# Patient Record
Sex: FEMALE | Race: BLACK OR AFRICAN AMERICAN | Employment: FULL TIME | URBAN - METROPOLITAN AREA
[De-identification: names, ages, dates, MRNs, and addresses within clinical notes are randomized per-mention and may not be internally consistent; named-entity substitution may affect disease eponyms.]

---

## 2017-01-19 ENCOUNTER — OFFICE VISIT (OUTPATIENT)
Dept: FAMILY MEDICINE CLINIC | Age: 57
End: 2017-01-19

## 2017-01-19 VITALS
WEIGHT: 205 LBS | HEART RATE: 58 BPM | RESPIRATION RATE: 16 BRPM | BODY MASS INDEX: 34.16 KG/M2 | HEIGHT: 65 IN | TEMPERATURE: 97.2 F | DIASTOLIC BLOOD PRESSURE: 87 MMHG | SYSTOLIC BLOOD PRESSURE: 146 MMHG

## 2017-01-19 DIAGNOSIS — R06.2 WHEEZING: ICD-10-CM

## 2017-01-19 DIAGNOSIS — K59.09 CHRONIC CONSTIPATION: ICD-10-CM

## 2017-01-19 DIAGNOSIS — I10 ESSENTIAL HYPERTENSION: ICD-10-CM

## 2017-01-19 DIAGNOSIS — Z51.81 MEDICATION MONITORING ENCOUNTER: ICD-10-CM

## 2017-01-19 DIAGNOSIS — E11.9 CONTROLLED TYPE 2 DIABETES MELLITUS WITHOUT COMPLICATION, UNSPECIFIED LONG TERM INSULIN USE STATUS: Primary | ICD-10-CM

## 2017-01-19 DIAGNOSIS — E87.6 HYPOKALEMIA: ICD-10-CM

## 2017-01-19 DIAGNOSIS — K21.9 GASTROESOPHAGEAL REFLUX DISEASE WITHOUT ESOPHAGITIS: ICD-10-CM

## 2017-01-19 DIAGNOSIS — E78.5 HYPERLIPIDEMIA, UNSPECIFIED HYPERLIPIDEMIA TYPE: ICD-10-CM

## 2017-01-19 LAB
GLUCOSE POC: 113 MG/DL
HBA1C MFR BLD HPLC: 6.7 %

## 2017-01-19 RX ORDER — ALBUTEROL SULFATE 90 UG/1
2 AEROSOL, METERED RESPIRATORY (INHALATION)
Qty: 3 INHALER | Refills: 0 | Status: SHIPPED | OUTPATIENT
Start: 2017-01-19 | End: 2018-01-16 | Stop reason: SDUPTHER

## 2017-01-19 RX ORDER — ESOMEPRAZOLE MAGNESIUM 40 MG/1
CAPSULE, DELAYED RELEASE ORAL
Qty: 30 CAP | Refills: 3 | Status: SHIPPED | OUTPATIENT
Start: 2017-01-19 | End: 2017-03-23 | Stop reason: SDUPTHER

## 2017-01-19 NOTE — PROGRESS NOTES
HISTORY OF PRESENT ILLNESS  Magali Melissa is a 61 y.o. female. Chief Complaint   Patient presents with    Constipation    Cholesterol Problem chronic problem, stable      high chol    GERD    Hypertension Chronic problem, uncontrolled today    Allergic Rhinitis   Diabetes chronic problem, stable  Hyperlipidemia chronic problem, stable   Asthma . chronic problem, stable occ wheezing alb helpful  Constipation chronic problem, stable linzess effective  HPI  Past Medical History   Diagnosis Date    Asthma     Constipation     GERD (gastroesophageal reflux disease)     Hypercholesterolemia     Hypertension      Current Outpatient Prescriptions   Medication Sig Dispense Refill    atorvastatin (LIPITOR) 10 mg tablet Take 1 Tab by mouth nightly. 90 Tab 3    esomeprazole (NEXIUM) 40 mg capsule take 1 capsule by mouth once daily 30 Cap 3    nebivolol (BYSTOLIC) 10 mg tablet Take 1 Tab by mouth daily. 90 Tab 3    losartan-hydrochlorothiazide (HYZAAR) 100-25 mg per tablet Take 1 Tab by mouth daily. 90 Tab 3    albuterol (PROAIR HFA) 90 mcg/actuation inhaler Take 2 Puffs by inhalation every four (4) hours as needed for Wheezing. 3 Inhaler 0    linaclotide (LINZESS) 145 mcg cap capsule Take 1 Cap by mouth Daily (before breakfast). 90 Cap 3    aspirin delayed-release (ADULT LOW DOSE ASPIRIN) 81 mg tablet Take 1 Tab by mouth daily. 30 Tab prn    loratadine (CLARITIN) 10 mg tablet Take 10 mg by mouth daily. Allergies   Allergen Reactions    Lisinopril Other (comments)     Pt states gave her stomach cramps       ROS Respiratory: Negative for shortness of breath. Cardiovascular: Negative for chest pain. Genitourinary: Negative for frequency. Neurological: Negative for dizziness and headaches.     Visit Vitals    /87 (BP 1 Location: Right arm, BP Patient Position: Sitting)    Pulse (!) 58    Temp 97.2 °F (36.2 °C) (Oral)    Resp 16    Ht 5' 5\" (1.651 m)    Wt 205 lb (93 kg)    BMI 34.11 kg/m2       Physical Exam Nursing note and vitals reviewed. Constitutional: She is oriented to person, place, and time. She appears well-developed and well-nourished. No distress. HENT:   Mouth/Throat: Oropharynx is clear and moist.   Neck: No JVD present. No thyromegaly present. Cardiovascular: Normal rate, regular rhythm and normal heart sounds. Pulmonary/Chest: Effort normal and breath sounds normal. No respiratory distress. She has no wheezes. She has no rales. Musculoskeletal: She exhibits no edema. Lymphadenopathy:     She has no cervical adenopathy. Neurological: She is alert and oriented to person, place, and time. Coordination normal.   Psychiatric: She has a normal mood and affect. Her behavior is normal.    Results for orders placed or performed in visit on 01/19/17   AMB POC HEMOGLOBIN A1C   Result Value Ref Range    Hemoglobin A1c (POC) 6.7 %   AMB POC GLUCOSE, QUANTITATIVE, BLOOD   Result Value Ref Range    Glucose  mg/dL       ASSESSMENT and PLAN    ICD-10-CM ICD-9-CM    1. Controlled type 2 diabetes mellitus without complication, unspecified long term insulin use status (HCC) Stable, continue current care. E11.9 250.00 AMB POC HEMOGLOBIN A1C      AMB POC GLUCOSE, QUANTITATIVE, BLOOD      METABOLIC PANEL, COMPREHENSIVE      HEMOGLOBIN A1C W/O EAG   2. Chronic constipation chronic problem, stable  K59.09 564.00 linaclotide (LINZESS) 145 mcg cap capsule   3. Wheezing. .stable continue current medications  R06.2 786.07 albuterol (PROAIR HFA) 90 mcg/actuation inhaler   4. Gastroesophageal reflux disease without esophagitis stable continue current medications  K21.9 530.81 esomeprazole (NEXIUM) 40 mg capsule   5. Essential hypertension stable continue current medications  T09 477.1 METABOLIC PANEL, COMPREHENSIVE   6. Hypokalemia G05.2 885.1 METABOLIC PANEL, COMPREHENSIVE   7.  Medication monitoring encounter G67.65 O62.58 METABOLIC PANEL, COMPREHENSIVE      LIPID PANEL      HEMOGLOBIN A1C W/O EAG   8. Hyperlipidemia, unspecified hyperlipidemia type Check labs on follow up   L40.3 760.1 METABOLIC PANEL, COMPREHENSIVE      LIPID PANEL   Follow-up Disposition:  Return in about 3 months (around 4/19/2017).

## 2017-01-19 NOTE — MR AVS SNAPSHOT
Visit Information Date & Time Provider Department Dept. Phone Encounter #  
 1/19/2017  9:30 AM Isabella Grayson MD [address] [phone] [id number] Follow-up Instructions Return in about 3 months (around 4/19/2017). Upcoming Health Maintenance Date Due  
 FOOT EXAM Q1 12/11/1969 FOBT Q 1 YEAR AGE 50-75 12/11/2009 INFLUENZA AGE 9 TO ADULT 8/1/2016 HEMOGLOBIN A1C Q6M 2/8/2017 EYE EXAM RETINAL OR DILATED Q1 4/27/2017 MICROALBUMIN Q1 8/8/2017 LIPID PANEL Q1 8/8/2017 BREAST CANCER SCRN MAMMOGRAM 6/21/2018 PAP AKA CERVICAL CYTOLOGY 8/24/2019 DTaP/Tdap/Td series (2 - Td) 5/17/2026 Allergies as of 1/19/2017  Review Complete On: 1/19/2017 By: Isabella Grayson MD  
  
 Severity Noted Reaction Type Reactions Lisinopril  09/22/2014    Other (comments) Pt states gave her stomach cramps Current Immunizations  Reviewed on 5/17/2016 Name Date Influenza Vaccine 10/3/2014 Pneumococcal Polysaccharide (PPSV-23) 5/17/2016 Tdap 5/17/2016 Not reviewed this visit You Were Diagnosed With   
  
 Codes Comments Controlled type 2 diabetes mellitus without complication, unspecified long term insulin use status ([facility]    -  Primary ICD-10-CM: E11.9 ICD-9-CM: 250.00 Chronic constipation     ICD-10-CM: K59.09 
ICD-9-CM: 564.00 Wheezing     ICD-10-CM: R06.2 ICD-9-CM: 786.07 Gastroesophageal reflux disease without esophagitis     ICD-10-CM: K21.9 ICD-9-CM: 530.81 Essential hypertension     ICD-10-CM: I10 
ICD-9-CM: 401.9 Hypokalemia     ICD-10-CM: E87.6 ICD-9-CM: 276.8 Medication monitoring encounter     ICD-10-CM: Z51.81 
ICD-9-CM: V58.83 Hyperlipidemia, unspecified hyperlipidemia type     ICD-10-CM: E78.5 ICD-9-CM: 272.4 Vitals BP Pulse Temp Resp Height(growth percentile) Weight(growth percentile)  146/87 (BP 1 Location: Right arm, BP Patient Position: Sitting) (!) 58 97.2 °F (36.2 °C) (Oral) 16 5' 5\" (1.651 m) 205 lb (93 kg) BMI OB Status Smoking Status 34.11 kg/m2 Hysterectomy Current Some Day Smoker BMI and BSA Data Body Mass Index Body Surface Area  
 34.11 kg/m 2 2.07 m 2 Preferred Pharmacy Pharmacy Name Phone [facility], [address] Your Updated Medication List  
  
   
This list is accurate as of: 1/19/17 10:17 AM.  Always use your most recent med list.  
  
  
  
  
 ADULT LOW DOSE ASPIRIN 81 mg tablet Generic drug:  aspirin delayed-release Take 1 Tab by mouth daily. albuterol 90 mcg/actuation inhaler Commonly known as:  PROAIR HFA Take 2 Puffs by inhalation every four (4) hours as needed for Wheezing. atorvastatin 10 mg tablet Commonly known as:  LIPITOR Take 1 Tab by mouth nightly. CLARITIN 10 mg tablet Generic drug:  loratadine Take 10 mg by mouth daily. esomeprazole 40 mg capsule Commonly known as:  NEXIUM  
take 1 capsule by mouth once daily  
  
 linaclotide 145 mcg Cap capsule Commonly known as:  Ronna Raegan Take 1 Cap by mouth Daily (before breakfast). losartan-hydroCHLOROthiazide 100-25 mg per tablet Commonly known as:  HYZAAR Take 1 Tab by mouth daily. nebivolol 10 mg tablet Commonly known as:  BYSTOLIC Take 1 Tab by mouth daily. Prescriptions Sent to Pharmacy Refills  
 linaclotide (LINZESS) 145 mcg cap capsule 3 Sig: Take 1 Cap by mouth Daily (before breakfast). Class: Normal  
 Pharmacy: [address] [place] [address], [address] [place] [address] Ph #: [phone] Route: Oral  
 albuterol (PROAIR HFA) 90 mcg/actuation inhaler 0 Sig: Take 2 Puffs by inhalation every four (4) hours as needed for Wheezing. Class: Normal  
 Pharmacy: [address] [place] [address], [address] [place] [address] Ph #: [phone] Route: Inhalation esomeprazole (NEXIUM) 40 mg capsule 3 Sig: take 1 capsule by mouth once daily Class: Normal  
 Pharmacy: [facility], [address] [facility] #: [phone] We Performed the Following AMB POC GLUCOSE, QUANTITATIVE, BLOOD [77636 CPT(R)] AMB POC HEMOGLOBIN A1C [05939 CPT(R)] Follow-up Instructions Return in about 3 months (around 4/19/2017). To-Do List   
 04/19/2017 Lab:  HEMOGLOBIN A1C W/O EAG   
  
 04/19/2017 Lab:  LIPID PANEL   
  
 04/19/2017 Lab:  METABOLIC PANEL, COMPREHENSIVE Patient Instructions Please contact our office if you have any questions about your visit today. Introducing [facility] & HEALTH SERVICES! [facility] introduces [employer] patient portal. Now you can access parts of your medical record, email your doctor's office, and request medication refills online. 1. In your internet browser, go to [email or web address] 2. Click on the First Time User? Click Here link in the Sign In box. You will see the New Member Sign Up page. 3. Enter your [employer] Access Code exactly as it appears below. You will not need to use this code after youve completed the sign-up process. If you do not sign up before the expiration date, you must request a new code. · [employer] Access Code: [email or web address] Expires: 4/19/2017  9:18 AM 
 
4. Enter the last four digits of your Social Security Number (xxxx) and Date of Birth (mm/dd/yyyy) as indicated and click Submit. You will be taken to the next sign-up page. 5. Create a [employer] ID. This will be your [employer] login ID and cannot be changed, so think of one that is secure and easy to remember. 6. Create a [employer] password. You can change your password at any time. 7. Enter your Password Reset Question and Answer. This can be used at a later time if you forget your password. 8. Enter your e-mail address.  You will receive e-mail notification when new information is available in [employer]. 9. Click Sign Up. You can now view and download portions of your medical record. 10. Click the Download Summary menu link to download a portable copy of your medical information. If you have questions, please visit the Frequently Asked Questions section of the [employer] website. Remember, [employer] is NOT to be used for urgent needs. For medical emergencies, dial 911. Now available from your iPhone and Android! Please provide this summary of care documentation to your next provider. Your primary care clinician is listed as PALLAVI LAM. If you have any questions after today's visit, please call [phone].

## 2017-01-19 NOTE — PROGRESS NOTES
Chief Complaint   Patient presents with    Constipation    Cholesterol Problem     high chol    GERD    Hypertension    Allergic Rhinitis       Health Maintenance reviewed     1. Have you been to the ER, urgent care clinic since your last visit? Hospitalized since your last visit? No    2. Have you seen or consulted any other health care providers outside of the [facility] since your last visit? Include any pap smears or colon screening.  No

## 2017-03-23 DIAGNOSIS — K21.9 GASTROESOPHAGEAL REFLUX DISEASE WITHOUT ESOPHAGITIS: ICD-10-CM

## 2017-03-23 RX ORDER — ATORVASTATIN CALCIUM 10 MG/1
10 TABLET, FILM COATED ORAL
Qty: 90 TAB | Refills: 3 | Status: SHIPPED | COMMUNITY
Start: 2017-03-23

## 2017-03-23 RX ORDER — LOSARTAN POTASSIUM AND HYDROCHLOROTHIAZIDE 25; 100 MG/1; MG/1
1 TABLET ORAL DAILY
Qty: 90 TAB | Refills: 3 | Status: SHIPPED | COMMUNITY
Start: 2017-03-23

## 2017-03-23 RX ORDER — ESOMEPRAZOLE MAGNESIUM 40 MG/1
CAPSULE, DELAYED RELEASE ORAL
Qty: 30 CAP | Refills: 3 | Status: SHIPPED | COMMUNITY
Start: 2017-03-23

## 2017-03-23 RX ORDER — NEBIVOLOL 10 MG/1
10 TABLET ORAL DAILY
Qty: 90 TAB | Refills: 3 | Status: SHIPPED | COMMUNITY
Start: 2017-03-23 | End: 2018-01-09 | Stop reason: ALTCHOICE

## 2017-03-23 NOTE — TELEPHONE ENCOUNTER
Pt needs a refill on Losartan 100/25 mg 1 a day, Atorvastatin 10 mg 1 a day, Bystolic 10 mg 1 a day, Nexium (generic) 40 mg [facility]

## 2017-04-12 ENCOUNTER — HOSPITAL ENCOUNTER (OUTPATIENT)
Dept: LAB | Age: 57
Discharge: HOME | End: 2017-04-12
Payer: COMMERCIAL

## 2017-04-12 LAB
ALBUMIN SERPL BCP-MCNC: 3.9 G/DL (ref 3.4–5)
ALBUMIN/GLOB SERPL: 1.1 {RATIO} (ref 0.8–1.7)
ALP SERPL-CCNC: 70 U/L (ref 45–117)
ALT SERPL-CCNC: 26 U/L (ref 13–56)
ANION GAP BLD CALC-SCNC: 7 MMOL/L (ref 3–18)
AST SERPL W P-5'-P-CCNC: 17 U/L (ref 15–37)
BILIRUB SERPL-MCNC: 0.3 MG/DL (ref 0.2–1)
BUN SERPL-MCNC: 18 MG/DL (ref 7–18)
BUN/CREAT SERPL: 20 (ref 12–20)
CALCIUM SERPL-MCNC: 9 MG/DL (ref 8.5–10.1)
CHLORIDE SERPL-SCNC: 101 MMOL/L (ref 100–108)
CHOLEST SERPL-MCNC: 146 MG/DL
CO2 SERPL-SCNC: 32 MMOL/L (ref 21–32)
CREAT SERPL-MCNC: 0.91 MG/DL (ref 0.6–1.3)
GLOBULIN SER CALC-MCNC: 3.6 G/DL (ref 2–4)
GLUCOSE SERPL-MCNC: 118 MG/DL (ref 74–99)
HBA1C MFR BLD: 7.1 % (ref 4.2–5.6)
HDLC SERPL-MCNC: 54 MG/DL (ref 40–60)
HDLC SERPL: 2.7 {RATIO} (ref 0–5)
LDLC SERPL CALC-MCNC: 69.2 MG/DL (ref 0–100)
LIPID PROFILE,FLP: NORMAL
POTASSIUM SERPL-SCNC: 3.4 MMOL/L (ref 3.5–5.5)
PROT SERPL-MCNC: 7.5 G/DL (ref 6.4–8.2)
SODIUM SERPL-SCNC: 140 MMOL/L (ref 136–145)
TRIGL SERPL-MCNC: 114 MG/DL (ref ?–150)
VLDLC SERPL CALC-MCNC: 22.8 MG/DL

## 2017-04-12 PROCEDURE — 83036 HEMOGLOBIN GLYCOSYLATED A1C: CPT | Performed by: FAMILY MEDICINE

## 2017-04-12 PROCEDURE — 36415 COLL VENOUS BLD VENIPUNCTURE: CPT | Performed by: FAMILY MEDICINE

## 2017-04-12 PROCEDURE — 80053 COMPREHEN METABOLIC PANEL: CPT | Performed by: FAMILY MEDICINE

## 2017-04-12 PROCEDURE — 80061 LIPID PANEL: CPT | Performed by: FAMILY MEDICINE

## 2017-05-17 ENCOUNTER — OFFICE VISIT (OUTPATIENT)
Dept: FAMILY MEDICINE CLINIC | Age: 57
End: 2017-05-17

## 2017-05-17 ENCOUNTER — HOSPITAL ENCOUNTER (OUTPATIENT)
Dept: LAB | Age: 57
Discharge: HOME | End: 2017-05-17
Payer: COMMERCIAL

## 2017-05-17 VITALS
TEMPERATURE: 97.5 F | WEIGHT: 205 LBS | OXYGEN SATURATION: 99 % | BODY MASS INDEX: 34.16 KG/M2 | DIASTOLIC BLOOD PRESSURE: 80 MMHG | HEART RATE: 54 BPM | HEIGHT: 65 IN | SYSTOLIC BLOOD PRESSURE: 140 MMHG

## 2017-05-17 DIAGNOSIS — E78.5 HYPERLIPIDEMIA, UNSPECIFIED HYPERLIPIDEMIA TYPE: ICD-10-CM

## 2017-05-17 DIAGNOSIS — R22.32 MASS OF ELBOW REGION, LEFT: ICD-10-CM

## 2017-05-17 DIAGNOSIS — R53.83 FATIGUE, UNSPECIFIED TYPE: ICD-10-CM

## 2017-05-17 DIAGNOSIS — M54.9 MID BACK PAIN ON RIGHT SIDE: ICD-10-CM

## 2017-05-17 DIAGNOSIS — M62.830 MUSCLE SPASM OF BACK: ICD-10-CM

## 2017-05-17 DIAGNOSIS — I10 ESSENTIAL HYPERTENSION: ICD-10-CM

## 2017-05-17 LAB
BASOPHILS # BLD AUTO: 0.1 K/UL (ref 0–0.06)
BASOPHILS # BLD: 1 % (ref 0–2)
DIFFERENTIAL METHOD BLD: ABNORMAL
EOSINOPHIL # BLD: 0.2 K/UL (ref 0–0.4)
EOSINOPHIL NFR BLD: 3 % (ref 0–5)
ERYTHROCYTE [DISTWIDTH] IN BLOOD BY AUTOMATED COUNT: 14.2 % (ref 11.6–14.5)
HCT VFR BLD AUTO: 37.9 % (ref 35–45)
HGB BLD-MCNC: 12.4 G/DL (ref 12–16)
LYMPHOCYTES # BLD AUTO: 42 % (ref 21–52)
LYMPHOCYTES # BLD: 3 K/UL (ref 0.9–3.6)
MCH RBC QN AUTO: 28.7 PG (ref 24–34)
MCHC RBC AUTO-ENTMCNC: 32.7 G/DL (ref 31–37)
MCV RBC AUTO: 87.7 FL (ref 74–97)
MONOCYTES # BLD: 0.4 K/UL (ref 0.05–1.2)
MONOCYTES NFR BLD AUTO: 6 % (ref 3–10)
NEUTS SEG # BLD: 3.4 K/UL (ref 1.8–8)
NEUTS SEG NFR BLD AUTO: 48 % (ref 40–73)
PLATELET # BLD AUTO: 221 K/UL (ref 135–420)
PMV BLD AUTO: 10.7 FL (ref 9.2–11.8)
RBC # BLD AUTO: 4.32 M/UL (ref 4.2–5.3)
TSH SERPL DL<=0.05 MIU/L-ACNC: 0.75 UIU/ML (ref 0.36–3.74)
VIT B12 SERPL-MCNC: 665 PG/ML (ref 211–911)
WBC # BLD AUTO: 7 K/UL (ref 4.6–13.2)

## 2017-05-17 PROCEDURE — 36415 COLL VENOUS BLD VENIPUNCTURE: CPT | Performed by: FAMILY MEDICINE

## 2017-05-17 PROCEDURE — 82607 VITAMIN B-12: CPT | Performed by: FAMILY MEDICINE

## 2017-05-17 PROCEDURE — 84443 ASSAY THYROID STIM HORMONE: CPT | Performed by: FAMILY MEDICINE

## 2017-05-17 PROCEDURE — 85025 COMPLETE CBC W/AUTO DIFF WBC: CPT | Performed by: FAMILY MEDICINE

## 2017-05-17 PROCEDURE — 82306 VITAMIN D 25 HYDROXY: CPT | Performed by: FAMILY MEDICINE

## 2017-05-17 RX ORDER — METAXALONE 800 MG/1
800 TABLET ORAL 3 TIMES DAILY
Qty: 30 TAB | Refills: 0 | Status: SHIPPED | OUTPATIENT
Start: 2017-05-17 | End: 2017-10-19

## 2017-05-17 NOTE — PROGRESS NOTES
HISTORY OF PRESENT ILLNESS  Khai Guy is a 61 y.o. female. Chief Complaint   Patient presents with    Back Pain     2 months ago, upper and lower right side, [facility] and [facility] in [place] 1 week ago, given muscle relaxer pain med, not effective for pain, states urine was normal     Mass     left arm knot, noticed yesterday    Fatigue     wants Vitamin D checked.  Diabetes Chronic problem, uncontrolled      had labs done    Hypertension chronic problem, stable     Cholesterol Problem    Labs     Complains of a lump on the elbow  HPI  Past Medical History:   Diagnosis Date    Asthma     Constipation     GERD (gastroesophageal reflux disease)     Hypercholesterolemia     Hypertension      Current Outpatient Prescriptions   Medication Sig Dispense Refill    losartan-hydroCHLOROthiazide (HYZAAR) 100-25 mg per tablet Take 1 Tab by mouth daily. 90 Tab 3    atorvastatin (LIPITOR) 10 mg tablet Take 1 Tab by mouth nightly. 90 Tab 3    nebivolol (BYSTOLIC) 10 mg tablet Take 1 Tab by mouth daily. 90 Tab 3    esomeprazole (NEXIUM) 40 mg capsule take 1 capsule by mouth once daily 30 Cap 3    linaclotide (LINZESS) 145 mcg cap capsule Take 1 Cap by mouth Daily (before breakfast). 90 Cap 3    albuterol (PROAIR HFA) 90 mcg/actuation inhaler Take 2 Puffs by inhalation every four (4) hours as needed for Wheezing. 3 Inhaler 0    loratadine (CLARITIN) 10 mg tablet Take 10 mg by mouth daily.  aspirin delayed-release (ADULT LOW DOSE ASPIRIN) 81 mg tablet Take 1 Tab by mouth daily. 30 Tab prn     Allergies   Allergen Reactions    Lisinopril Other (comments)     Pt states gave her stomach cramps       ROS Respiratory: Negative for shortness of breath. Cardiovascular: Negative for chest pain. Genitourinary: Negative for frequency. Neurological: Negative for dizziness and headaches.     Visit Vitals    /80  Comment: manual    Pulse (!) 54    Temp 97.5 °F (36.4 °C) (Oral)  Ht 5' 5\" (1.651 m)    Wt 205 lb (93 kg)    SpO2 99%    BMI 34.11 kg/m2       Physical Exam Nursing note and vitals reviewed. Constitutional: She is oriented to person, place, and time. She appears well-developed and well-nourished. No distress. HENT:   Mouth/Throat: Oropharynx is clear and moist.   Neck: No JVD present. No thyromegaly present. Cardiovascular: Normal rate, regular rhythm and normal heart sounds. Pulmonary/Chest: Effort normal and breath sounds normal. No respiratory distress. She has no wheezes. She has no rales. Musculoskeletal: She exhibits no edema. Lymphadenopathy:     She has no cervical adenopathy. Neurological: She is alert and oriented to person, place, and time. Coordination normal.   Psychiatric: She has a normal mood and affect. Her behavior is normal.    Results for orders placed or performed during the hospital encounter of 04/12/17   LIPID PANEL   Result Value Ref Range    LIPID PROFILE          Cholesterol, total 146 <200 MG/DL    Triglyceride 114 <150 MG/DL    HDL Cholesterol 54 40 - 60 MG/DL    LDL, calculated 69.2 0 - 100 MG/DL    VLDL, calculated 22.8 MG/DL    CHOL/HDL Ratio 2.7 0 - 5.0     METABOLIC PANEL, COMPREHENSIVE   Result Value Ref Range    Sodium 140 136 - 145 mmol/L    Potassium 3.4 (L) 3.5 - 5.5 mmol/L    Chloride 101 100 - 108 mmol/L    CO2 32 21 - 32 mmol/L    Anion gap 7 3.0 - 18 mmol/L    Glucose 118 (H) 74 - 99 mg/dL    BUN 18 7.0 - 18 MG/DL    Creatinine 0.91 0.6 - 1.3 MG/DL    BUN/Creatinine ratio 20 12 - 20      GFR est AA >60 >60 ml/min/1.73m2    GFR est non-AA >60 >60 ml/min/1.73m2    Calcium 9.0 8.5 - 10.1 MG/DL    Bilirubin, total 0.3 0.2 - 1.0 MG/DL    ALT (SGPT) 26 13 - 56 U/L    AST (SGOT) 17 15 - 37 U/L    Alk.  phosphatase 70 45 - 117 U/L    Protein, total 7.5 6.4 - 8.2 g/dL    Albumin 3.9 3.4 - 5.0 g/dL    Globulin 3.6 2.0 - 4.0 g/dL    A-G Ratio 1.1 0.8 - 1.7     HEMOGLOBIN A1C W/O EAG   Result Value Ref Range    Hemoglobin A1c 7.1 (H) 4.2 - 5.6 %         ASSESSMENT and PLAN    ICD-10-CM ICD-9-CM    1. Uncontrolled type 2 diabetes mellitus without complication, without long-term current use of insulin (HCC) E11.65 250.02    2. Essential hypertension I10 401.9    3. Hyperlipidemia, unspecified hyperlipidemia type E78.5 272.4    4. Mass of elbow region, left R22.32 719.62 XR ELBOW LT MIN 3 V   5. Mid back pain on right side M54.9 724.5 XR ABD (AP AND ERECT OR DECUB)      XR SPINE LUMB COMP W BEND   6. Muscle spasm of back M62.830 724.8 metaxalone (SKELAXIN) 800 mg tablet   7. Fatigue, unspecified type R53.83 780.79 CBC WITH AUTOMATED DIFF      VITAMIN B12      VITAMIN D, 25 HYDROXY      TSH 3RD GENERATION   Follow-up Disposition:  Return in about 2 weeks (around 6/2/2017) for 11:15.

## 2017-05-17 NOTE — PROGRESS NOTES
Chief Complaint   Patient presents with    Back Pain     2 months ago, upper and lower right side, [facility] and [facility]CC in [place] 1 week ago, given muscle relaxer pain med, not effective for pain, states urine was normal     Mass     left arm knot, noticed yesterday    Fatigue     wants Vitamin D checked.  Diabetes     had labs done    Hypertension    Cholesterol Problem    Labs     1. Have you been to the ER, urgent care clinic since your last visit? Hospitalized since your last visit? Yes When: 3 weeks and 1 week ago [facility] in [place] Reason for visit: back pain    2. Have you seen or consulted any other health care providers outside of the [address] [place] [address] since your last visit? Include any pap smears or colon screening.  No

## 2017-05-17 NOTE — MR AVS SNAPSHOT
Visit Information Date & Time Provider Department Dept. Phone Encounter #  
 5/17/2017  9:30 AM Ricky Love MD [address] [phone] [id number] Follow-up Instructions Return in about 2 weeks (around 6/2/2017) for 11:15. Upcoming Health Maintenance Date Due  
 FOOT EXAM Q1 [birth year]11[birth year]1969 FOBT Q 1 YEAR AGE 50-75 12/11/2009 EYE EXAM RETINAL OR DILATED Q1 4/27/2017 INFLUENZA AGE 9 TO ADULT 8/1/2017 MICROALBUMIN Q1 8/8/2017 HEMOGLOBIN A1C Q6M 10/12/2017 LIPID PANEL Q1 4/12/2018 BREAST CANCER SCRN MAMMOGRAM 6/21/2018 PAP AKA CERVICAL CYTOLOGY 8/24/2019 DTaP/Tdap/Td series (2 - Td) 5/17/2026 Allergies as of 5/17/2017  Review Complete On: 5/17/2017 By: Ricky Love MD  
  
 Severity Noted Reaction Type Reactions Lisinopril  09/22/2014    Other (comments) Pt states gave her stomach cramps Current Immunizations  Reviewed on 5/17/2016 Name Date Influenza Vaccine 10/3/2014 Pneumococcal Polysaccharide (PPSV-23) 5/17/2016 Tdap 5/17/2016 Not reviewed this visit You Were Diagnosed With   
  
 Codes Comments Uncontrolled type 2 diabetes mellitus without complication, without long-term current use of insulin ([facility]    -  Primary ICD-10-CM: E11.65 ICD-9-CM: 250.02 Essential hypertension     ICD-10-CM: I10 
ICD-9-CM: 401.9 Hyperlipidemia, unspecified hyperlipidemia type     ICD-10-CM: E78.5 ICD-9-CM: 272.4 Mass of elbow region, left     ICD-10-CM: R22.32 
ICD-9-CM: 719.62 Mid back pain on right side     ICD-10-CM: M54.9 ICD-9-CM: 724.5 Muscle spasm of back     ICD-10-CM: C86.123 ICD-9-CM: 724.8 Fatigue, unspecified type     ICD-10-CM: R53.83 ICD-9-CM: 780.79 Vitals BP Pulse Temp Height(growth percentile) Weight(growth percentile) SpO2  
 140/80 (!) 54 97.5 °F (36.4 °C) (Oral) 5' 5\" (1.651 m) 205 lb (93 kg) 99% BMI OB Status Smoking Status 34.11 kg/m2 Hysterectomy Current Some Day Smoker Vitals History BMI and BSA Data Body Mass Index Body Surface Area  
 34.11 kg/m 2 2.07 m 2 Preferred Pharmacy Pharmacy Name Phone [address] [facility], [address] Your Updated Medication List  
  
   
This list is accurate as of: 5/17/17 10:48 AM.  Always use your most recent med list.  
  
  
  
  
 ADULT LOW DOSE ASPIRIN 81 mg tablet Generic drug:  aspirin delayed-release Take 1 Tab by mouth daily. albuterol 90 mcg/actuation inhaler Commonly known as:  PROAIR HFA Take 2 Puffs by inhalation every four (4) hours as needed for Wheezing. atorvastatin 10 mg tablet Commonly known as:  LIPITOR Take 1 Tab by mouth nightly. CLARITIN 10 mg tablet Generic drug:  loratadine Take 10 mg by mouth daily. esomeprazole 40 mg capsule Commonly known as:  NEXIUM  
take 1 capsule by mouth once daily  
  
 linaclotide 145 mcg Cap capsule Commonly known as:  Darolyn Bathe Take 1 Cap by mouth Daily (before breakfast). losartan-hydroCHLOROthiazide 100-25 mg per tablet Commonly known as:  HYZAAR Take 1 Tab by mouth daily. metaxalone 800 mg tablet Commonly known as:  SKELAXIN Take 1 Tab by mouth three (3) times daily. nebivolol 10 mg tablet Commonly known as:  BYSTOLIC Take 1 Tab by mouth daily. Prescriptions Sent to Pharmacy Refills  
 metaxalone (SKELAXIN) 800 mg tablet 0 Sig: Take 1 Tab by mouth three (3) times daily. Class: Normal  
 Pharmacy: [facility], [address] [facility] Ph #: [phone] Route: Oral  
  
Follow-up Instructions Return in about 2 weeks (around 6/2/2017) for 11:15. To-Do List   
 05/17/2017 Lab:  CBC WITH AUTOMATED DIFF   
  
 05/17/2017 Lab:  TSH 3RD GENERATION   
  
 05/17/2017 Lab:  VITAMIN B12   
  
 05/17/2017 Lab: VITAMIN D, 25 HYDROXY   
  
 05/17/2017 Imaging:  XR ABD (AP AND ERECT OR DECUB)   
  
 05/17/2017 Imaging:  XR ELBOW LT MIN 3 V   
  
 05/17/2017 Imaging:  XR SPINE LUMB COMP W BEND Patient Instructions Please contact our office if you have any questions about your visit today. Introducing [facility] & HE[facility]! [facility] introduces [employer] patient portal. Now you can access parts of your medical record, email your doctor's office, and request medication refills online. 1. In your internet browser, go to [email or web address] 2. Click on the First Time User? Click Here link in the Sign In box. You will see the New Member Sign Up page. 3. Enter your [employer] Access Code exactly as it appears below. You will not need to use this code after youve completed the sign-up process. If you do not sign up before the expiration date, you must request a new code. · [employer] Access Code: [email or web address] Expires: 8/15/2017 10:46 AM 
 
4. Enter the last four digits of your Social Security Number (xxxx) and Date of Birth (mm/dd/yyyy) as indicated and click Submit. You will be taken to the next sign-up page. 5. Create a [employer] ID. This will be your [employer] login ID and cannot be changed, so think of one that is secure and easy to remember. 6. Create a [employer] password. You can change your password at any time. 7. Enter your Password Reset Question and Answer. This can be used at a later time if you forget your password. 8. Enter your e-mail address. You will receive e-mail notification when new information is available in [address] 9. Click Sign Up. You can now view and download portions of your medical record. 10. Click the Download Summary menu link to download a portable copy of your medical information. If you have questions, please visit the Frequently Asked Questions section of the [employer] website.  Remember, [employer] is NOT to be used for urgent needs. For medical emergencies, dial 911. Now available from your iPhone and Android! Please provide this summary of care documentation to your next provider. Your primary care clinician is listed as PALLAVI LAM. If you have any questions after today's visit, please call [phone].

## 2017-05-18 LAB — 25(OH)D3 SERPL-MCNC: 13.7 NG/ML (ref 30–100)

## 2017-05-25 ENCOUNTER — HOSPITAL ENCOUNTER (OUTPATIENT)
Dept: GENERAL RADIOLOGY | Age: 57
Discharge: HOME | End: 2017-05-25
Payer: COMMERCIAL

## 2017-05-25 DIAGNOSIS — R22.32 MASS OF ELBOW REGION, LEFT: ICD-10-CM

## 2017-05-25 DIAGNOSIS — M54.9 MID BACK PAIN ON RIGHT SIDE: ICD-10-CM

## 2017-05-25 PROCEDURE — 72114 X-RAY EXAM L-S SPINE BENDING: CPT

## 2017-05-25 PROCEDURE — 73080 X-RAY EXAM OF ELBOW: CPT

## 2017-05-25 PROCEDURE — 74020 XR ABD (AP AND ERECT OR DECUB): CPT

## 2017-06-02 ENCOUNTER — OFFICE VISIT (OUTPATIENT)
Dept: FAMILY MEDICINE CLINIC | Age: 57
End: 2017-06-02

## 2017-06-02 ENCOUNTER — HOSPITAL ENCOUNTER (OUTPATIENT)
Dept: LAB | Age: 57
Discharge: HOME | End: 2017-06-02
Payer: COMMERCIAL

## 2017-06-02 VITALS
TEMPERATURE: 97.5 F | DIASTOLIC BLOOD PRESSURE: 72 MMHG | WEIGHT: 203.5 LBS | HEART RATE: 75 BPM | RESPIRATION RATE: 20 BRPM | SYSTOLIC BLOOD PRESSURE: 119 MMHG | BODY MASS INDEX: 33.91 KG/M2 | HEIGHT: 65 IN

## 2017-06-02 DIAGNOSIS — M54.50 ACUTE RIGHT-SIDED LOW BACK PAIN WITHOUT SCIATICA: ICD-10-CM

## 2017-06-02 DIAGNOSIS — R10.9 ACUTE RIGHT FLANK PAIN: ICD-10-CM

## 2017-06-02 DIAGNOSIS — R31.9 HEMATURIA: ICD-10-CM

## 2017-06-02 DIAGNOSIS — R31.9 HEMATURIA: Primary | ICD-10-CM

## 2017-06-02 DIAGNOSIS — M62.838 MUSCLE SPASM: ICD-10-CM

## 2017-06-02 LAB
BILIRUB UR QL STRIP: NEGATIVE
GLUCOSE UR-MCNC: NEGATIVE MG/DL
KETONES P FAST UR STRIP-MCNC: NEGATIVE MG/DL
PH UR STRIP: 7 [PH] (ref 4.6–8)
PROT UR QL STRIP: NEGATIVE MG/DL
SP GR UR STRIP: 1.01 (ref 1–1.03)
UA UROBILINOGEN AMB POC: NORMAL (ref 0.2–1)
URINALYSIS CLARITY POC: CLEAR
URINALYSIS COLOR POC: YELLOW
URINE BLOOD POC: NEGATIVE
URINE LEUKOCYTES POC: NEGATIVE
URINE NITRITES POC: NEGATIVE

## 2017-06-02 PROCEDURE — 87086 URINE CULTURE/COLONY COUNT: CPT | Performed by: FAMILY MEDICINE

## 2017-06-02 RX ORDER — CYCLOBENZAPRINE HCL 10 MG
10 TABLET ORAL
Qty: 30 TAB | Refills: 0 | Status: SHIPPED | OUTPATIENT
Start: 2017-06-02 | End: 2017-10-19

## 2017-06-02 NOTE — PROGRESS NOTES
HISTORY OF PRESENT ILLNESS  Georgie Warren is a 61 y.o. female. Chief Complaint   Patient presents with    Follow-up     2 week f/u on elbow mass and back pain    Results     discuss lab and xray results    complains of 2-3 months of pain on the right back/flank area, constant, 6-7/10, sudden onset of pain No injury or trauma. No urinary changes  No radiation, numbness or tingling or leg weakness    HPI  Past Medical History:   Diagnosis Date    Asthma     Constipation     GERD (gastroesophageal reflux disease)     Hypercholesterolemia     Hypertension      Current Outpatient Prescriptions   Medication Sig Dispense Refill    metaxalone (SKELAXIN) 800 mg tablet Take 1 Tab by mouth three (3) times daily. 30 Tab 0    losartan-hydroCHLOROthiazide (HYZAAR) 100-25 mg per tablet Take 1 Tab by mouth daily. 90 Tab 3    atorvastatin (LIPITOR) 10 mg tablet Take 1 Tab by mouth nightly. 90 Tab 3    nebivolol (BYSTOLIC) 10 mg tablet Take 1 Tab by mouth daily. 90 Tab 3    esomeprazole (NEXIUM) 40 mg capsule take 1 capsule by mouth once daily 30 Cap 3    linaclotide (LINZESS) 145 mcg cap capsule Take 1 Cap by mouth Daily (before breakfast). 90 Cap 3    albuterol (PROAIR HFA) 90 mcg/actuation inhaler Take 2 Puffs by inhalation every four (4) hours as needed for Wheezing. 3 Inhaler 0    aspirin delayed-release (ADULT LOW DOSE ASPIRIN) 81 mg tablet Take 1 Tab by mouth daily. 30 Tab prn    loratadine (CLARITIN) 10 mg tablet Take 10 mg by mouth daily. Allergies   Allergen Reactions    Lisinopril Other (comments)     Pt states gave her stomach cramps       Review of Systems   Gastrointestinal: Positive for abdominal pain. Negative for nausea and vomiting. Genitourinary: Positive for flank pain. Negative for dysuria, frequency and urgency. Musculoskeletal: Positive for back pain and joint pain. Negative for falls.      Visit Vitals    /72 (BP 1 Location: Right arm, BP Patient Position: Sitting)    Pulse 75    Temp 97.5 °F (36.4 °C) (Oral)    Resp 20    Ht 5' 5\" (1.651 m)    Wt 203 lb 8 oz (92.3 kg)    BMI 33.86 kg/m2        Physical Exam   Constitutional: She appears well-developed and well-nourished. No distress. Cardiovascular: Normal rate, regular rhythm and normal heart sounds. Pulmonary/Chest: Effort normal and breath sounds normal. No respiratory distress. She has no wheezes. She has no rales. Musculoskeletal: She exhibits tenderness. She exhibits no edema. Lumbar back: She exhibits tenderness and spasm. She exhibits normal range of motion. Nursing note and vitals reviewed. Results for orders placed or performed during the hospital encounter of 05/17/17   CBC WITH AUTOMATED DIFF   Result Value Ref Range    WBC 7.0 4.6 - 13.2 K/uL    RBC 4.32 4.20 - 5.30 M/uL    HGB 12.4 12.0 - 16.0 g/dL    HCT 37.9 35.0 - 45.0 %    MCV 87.7 74.0 - 97.0 FL    MCH 28.7 24.0 - 34.0 PG    MCHC 32.7 31.0 - 37.0 g/dL    RDW 14.2 11.6 - 14.5 %    PLATELET 258 600 - 473 K/uL    MPV 10.7 9.2 - 11.8 FL    NEUTROPHILS 48 40 - 73 %    LYMPHOCYTES 42 21 - 52 %    MONOCYTES 6 3 - 10 %    EOSINOPHILS 3 0 - 5 %    BASOPHILS 1 0 - 2 %    ABS. NEUTROPHILS 3.4 1.8 - 8.0 K/UL    ABS. LYMPHOCYTES 3.0 0.9 - 3.6 K/UL    ABS. MONOCYTES 0.4 0.05 - 1.2 K/UL    ABS. EOSINOPHILS 0.2 0.0 - 0.4 K/UL    ABS. BASOPHILS 0.1 (H) 0.0 - 0.06 K/UL    DF AUTOMATED     VITAMIN B12   Result Value Ref Range    Vitamin B12 665 211 - 911 pg/mL   VITAMIN D, 25 HYDROXY   Result Value Ref Range    Vitamin D 25-Hydroxy 13.7 (L) 30 - 100 ng/mL   TSH 3RD GENERATION   Result Value Ref Range    TSH 0.75 0.36 - 3.74 uIU/mL       Lumbar spine 7 views     History: Upper posterior back pain, low back pain     Comparison: None.     Technique: AP, Lateral, flexion and extension, oblique, and L5/S1 views were  obtained     Findings:      There is facet arthropathy at L5/S1. Vertebral body heights are within normal  limits.  There is multilevel mild degenerative disc space narrowing. No  spondylolisthesis is noted on neutral, extension or flexion views. Partially  visualized large spur off the right superior pubic bone at the pubic symphysis.     IMPRESSION  Impression:     Degenerative changes as above. No spondylolisthesis. Left Elbow 3 Views     History: Right elbow lump     Comparison: None     Findings:      There is no fracture, dislocation or other abnormality of the elbow. Joint  spaces and alignment are within normal limits. There is spurring at the coronoid  process. No joint effusion.     IMPRESSION  Impression:     No fracture or dislocation. Spurring at the coronoid process.       Abdomen KUB     INDICATION: Upper posterior back pain, low back pain.     COMPARISON: None     TECHNIQUE: 3 supine views of the abdomen were performed.     FINDINGS:      There is a nonobstructive bowel gas pattern. No evidence of pneumoperitoneum. The bones and soft tissue structures are otherwise unremarkable. Large bone spur  off the superior aspect of the medial superior pubic bone at the pubic  symphysis. Moderate amount of stool throughout the colon.     IMPRESSION  Impression:     No bowel obstruction or free air.     Results for orders placed or performed in visit on 06/02/17   AMB POC URINALYSIS DIP STICK AUTO W/O MICRO   Result Value Ref Range    Color (UA POC) Yellow     Clarity (UA POC) Clear     Glucose (UA POC) Negative Negative    Bilirubin (UA POC) Negative Negative    Ketones (UA POC) Negative Negative    Specific gravity (UA POC) 1.015 1.001 - 1.035    Blood (UA POC) Negative Negative    pH (UA POC) 7.0 4.6 - 8.0    Protein (UA POC) Negative Negative mg/dL    Urobilinogen (UA POC) 1 mg/dL 0.2 - 1    Nitrites (UA POC) Negative Negative    Leukocyte esterase (UA POC) Negative Negative       ASSESSMENT and PLAN    ICD-10-CM ICD-9-CM    1.  Hematuria R31.9 599.70 AMB POC URINALYSIS DIP STICK AUTO W/O MICRO      CULTURE, URINE      CT ABD PELV W WO CONT 2. Acute right flank pain R10.9 789.09 CT ABD PELV W WO CONT     338.19    3. Acute right-sided low back pain without sciatica M54.5 724.2 CT ABD PELV W WO CONT   4. Muscle spasm M62.838 728.85 cyclobenzaprine (FLEXERIL) 10 mg tablet   . Sivan Steel Follow-up Disposition:  Return in about 3 weeks (around 6/23/2017).

## 2017-06-02 NOTE — PROGRESS NOTES
Chief Complaint   Patient presents with    Follow-up     2 week f/u on elbow mass and back pain    Results     discuss lab and xray results       Health Maintenance reviewed    1. Have you been to the ER, urgent care clinic since your last visit? Hospitalized since your last visit? No    2. Have you seen or consulted any other health care providers outside of the [address] [place] [address] since your last visit? Include any pap smears or colon screening.  No

## 2017-06-02 NOTE — MR AVS SNAPSHOT
Visit Information Date & Time Provider Department Dept. Phone Encounter #  
 6/2/2017 11:15 AM Zeenta Seo MD [facility] [phone] [id number] Follow-up Instructions Return in about 3 weeks (around 6/23/2017). Upcoming Health Maintenance Date Due  
 FOOT EXAM Q1 12/11/1969 FOBT Q 1 YEAR AGE 50-75 12/11/2009 EYE EXAM RETINAL OR DILATED Q1 4/27/2017 INFLUENZA AGE 9 TO ADULT 8/1/2017 MICROALBUMIN Q1 8/8/2017 HEMOGLOBIN A1C Q6M 10/12/2017 LIPID PANEL Q1 4/12/2018 BREAST CANCER SCRN MAMMOGRAM 6/21/2018 PAP AKA CERVICAL CYTOLOGY 8/24/2019 DTaP/Tdap/Td series (2 - Td) 5/17/2026 Allergies as of 6/2/2017  Review Complete On: 5/17/2017 By: Zeenat Seo MD  
  
 Severity Noted Reaction Type Reactions Lisinopril  09/22/2014    Other (comments) Pt states gave her stomach cramps Current Immunizations  Reviewed on 5/17/2016 Name Date Influenza Vaccine 10/3/2014 Pneumococcal Polysaccharide (PPSV-23) 5/17/2016 Tdap 5/17/2016 Not reviewed this visit You Were Diagnosed With   
  
 Codes Comments Hematuria    -  Primary ICD-10-CM: R31.9 ICD-9-CM: 599.70 Acute right flank pain     ICD-10-CM: R10.9 ICD-9-CM: 789.09, 338.19 Acute right-sided low back pain without sciatica     ICD-10-CM: M54.5 ICD-9-CM: 724.2 Muscle spasm     ICD-10-CM: M80.759 ICD-9-CM: 728.85 Vitals BP Pulse Temp Resp Height(growth percentile) Weight(growth percentile) 119/72 (BP 1 Location: Right arm, BP Patient Position: Sitting) 75 97.5 °F (36.4 °C) (Oral) 20 5' 5\" (1.651 m) 203 lb 8 oz (92.3 kg) BMI OB Status Smoking Status 33.86 kg/m2 Hysterectomy Current Some Day Smoker BMI and BSA Data Body Mass Index Body Surface Area  
 33.86 kg/m 2 2.06 m 2 Preferred Pharmacy Pharmacy Name Phone [address] [place] [address], [address] [facility] Your Updated Medication List  
  
   
This list is accurate as of: 6/2/17 12:55 PM.  Always use your most recent med list.  
  
  
  
  
 ADULT LOW DOSE ASPIRIN 81 mg tablet Generic drug:  aspirin delayed-release Take 1 Tab by mouth daily. albuterol 90 mcg/actuation inhaler Commonly known as:  PROAIR HFA Take 2 Puffs by inhalation every four (4) hours as needed for Wheezing. atorvastatin 10 mg tablet Commonly known as:  LIPITOR Take 1 Tab by mouth nightly. CLARITIN 10 mg tablet Generic drug:  loratadine Take 10 mg by mouth daily. cyclobenzaprine 10 mg tablet Commonly known as:  FLEXERIL Take 1 Tab by mouth three (3) times daily as needed for Muscle Spasm(s). esomeprazole 40 mg capsule Commonly known as:  NEXIUM  
take 1 capsule by mouth once daily  
  
 linaclotide 145 mcg Cap capsule Commonly known as:  Sonam Noon Take 1 Cap by mouth Daily (before breakfast). losartan-hydroCHLOROthiazide 100-25 mg per tablet Commonly known as:  HYZAAR Take 1 Tab by mouth daily. metaxalone 800 mg tablet Commonly known as:  SKELAXIN Take 1 Tab by mouth three (3) times daily. nebivolol 10 mg tablet Commonly known as:  BYSTOLIC Take 1 Tab by mouth daily. Prescriptions Sent to Pharmacy Refills  
 cyclobenzaprine (FLEXERIL) 10 mg tablet 0 Sig: Take 1 Tab by mouth three (3) times daily as needed for Muscle Spasm(s). Class: Normal  
 Pharmacy: [address] [place] [address], [address] [place] [address] Ph #: [phone] Route: Oral  
  
We Performed the Following AMB POC URINALYSIS DIP STICK AUTO W/O MICRO [75997 CPT(R)] Follow-up Instructions Return in about 3 weeks (around 6/23/2017). To-Do List   
 06/02/2017 Imaging:  CT ABD PELV W WO CONT   
  
 06/02/2017 Microbiology:  CULTURE, URINE Patient Instructions Please contact our office if you have any questions about your visit today. Introducing [facility] HE[facility] SERVICES! [facility] introduces [employer] patient portal. Now you can access parts of your medical record, email your doctor's office, and request medication refills online. 1. In your internet browser, go to [email or web address] 2. Click on the First Time User? Click Here link in the Sign In box. You will see the New Member Sign Up page. 3. Enter your [employer] Access Code exactly as it appears below. You will not need to use this code after youve completed the sign-up process. If you do not sign up before the expiration date, you must request a new code. · Sun[employer] Access Code: [email or web address] Expires: 8/15/2017 10:46 AM 
 
4. Enter the last four digits of your Social Security Number (xxxx) and Date of Birth (mm/dd/yyyy) as indicated and click Submit. You will be taken to the next sign-up page. 5. Create a Sunfire ID. This will be your Sunfire login ID and cannot be changed, so think of one that is secure and easy to remember. 6. Create a Sunfire password. You can change your password at any time. 7. Enter your Password Reset Question and Answer. This can be used at a later time if you forget your password. 8. Enter your e-mail address. You will receive e-mail notification when new information is available in [address] 9. Click Sign Up. You can now view and download portions of your medical record. 10. Click the Download Summary menu link to download a portable copy of your medical information. If you have questions, please visit the Frequently Asked Questions section of the Sunfire website. Remember, Sunfire is NOT to be used for urgent needs. For medical emergencies, dial 911. Now available from your iPhone and Android! Please provide this summary of care documentation to your next provider. Your primary care clinician is listed as PALLAVI LAM. If you have any questions after today's visit, please call [phone].

## 2017-06-04 LAB
BACTERIA SPEC CULT: NORMAL
SERVICE CMNT-IMP: NORMAL

## 2017-06-14 ENCOUNTER — HOSPITAL ENCOUNTER (OUTPATIENT)
Dept: CT IMAGING | Age: 57
Discharge: HOME | End: 2017-06-14
Attending: FAMILY MEDICINE
Payer: COMMERCIAL

## 2017-06-14 DIAGNOSIS — M54.50 ACUTE RIGHT-SIDED LOW BACK PAIN WITHOUT SCIATICA: ICD-10-CM

## 2017-06-14 DIAGNOSIS — R31.9 HEMATURIA: ICD-10-CM

## 2017-06-14 DIAGNOSIS — R10.9 ACUTE RIGHT FLANK PAIN: ICD-10-CM

## 2017-06-14 LAB — CREAT UR-MCNC: 0.8 MG/DL (ref 0.6–1.3)

## 2017-06-14 PROCEDURE — 82565 ASSAY OF CREATININE: CPT

## 2017-06-14 PROCEDURE — 74011636320 HC RX REV CODE- 636/320: Performed by: FAMILY MEDICINE

## 2017-06-14 PROCEDURE — 74178 CT ABD&PLV WO CNTR FLWD CNTR: CPT

## 2017-06-14 RX ADMIN — IOPAMIDOL 100 ML: 612 INJECTION, SOLUTION INTRAVENOUS at 11:00

## 2017-07-11 ENCOUNTER — OFFICE VISIT (OUTPATIENT)
Dept: FAMILY MEDICINE CLINIC | Age: 57
End: 2017-07-11

## 2017-07-11 VITALS
SYSTOLIC BLOOD PRESSURE: 132 MMHG | RESPIRATION RATE: 20 BRPM | HEIGHT: 65 IN | BODY MASS INDEX: 33.49 KG/M2 | WEIGHT: 201 LBS | DIASTOLIC BLOOD PRESSURE: 86 MMHG | TEMPERATURE: 97 F | HEART RATE: 55 BPM

## 2017-07-11 DIAGNOSIS — E11.9 CONTROLLED TYPE 2 DIABETES MELLITUS WITHOUT COMPLICATION, UNSPECIFIED LONG TERM INSULIN USE STATUS: ICD-10-CM

## 2017-07-11 DIAGNOSIS — R93.89 ABNORMAL CT SCAN: ICD-10-CM

## 2017-07-11 DIAGNOSIS — M62.830 BACK MUSCLE SPASM: Primary | ICD-10-CM

## 2017-07-11 DIAGNOSIS — M89.9 BONE LESION: ICD-10-CM

## 2017-07-11 DIAGNOSIS — E55.9 VITAMIN D DEFICIENCY: ICD-10-CM

## 2017-07-11 NOTE — PROGRESS NOTES
Chief Complaint   Patient presents with    Follow-up     right flank pain, back pain and spasms    Results     discuss CT results       Health Maintenance Due   Topic Date Due    FOOT EXAM Q1  12[birth year]11/1969    FOBT Q 1 YEAR AGE 50-75  12/11/2009    EYE EXAM RETINAL OR DILATED Q1  04/27/2017    MICROALBUMIN Q1  08/08/2017       Health Maintenance reviewed     1. Have you been to the ER, urgent care clinic since your last visit? Hospitalized since your last visit? No    2. Have you seen or consulted any other health care providers outside of the [address] [place] [address] since your last visit? Include any pap smears or colon screening.  No

## 2017-07-11 NOTE — MR AVS SNAPSHOT
Visit Information Date & Time Provider Department Dept. Phone Encounter #  
 7/11/2017 10:45 AM Fausto Small MD [address] [phone] [id number] Follow-up Instructions Return in about 2 months (around 9/11/2017). Upcoming Health Maintenance Date Due  
 FOOT EXAM Q1 12[birth year]11/1970 FOBT Q 1 YEAR AGE 50-75 12/11/2009 EYE EXAM RETINAL OR DILATED Q1 4/27/2017 MICROALBUMIN Q1 8/8/2017 INFLUENZA AGE 9 TO ADULT 8/1/2017 HEMOGLOBIN A1C Q6M 10/12/2017 LIPID PANEL Q1 4/12/2018 BREAST CANCER SCRN MAMMOGRAM 6/21/2018 PAP AKA CERVICAL CYTOLOGY 8/24/2019 DTaP/Tdap/Td series (2 - Td) 5/17/2026 Allergies as of 7/11/2017  Review Complete On: 7/11/2017 By: Fausto Small MD  
  
 Severity Noted Reaction Type Reactions Lisinopril  09/22/2014    Other (comments) Pt states gave her stomach cramps Current Immunizations  Reviewed on 5/17/2016 Name Date Influenza Vaccine 10/3/2014 Pneumococcal Polysaccharide (PPSV-23) 5/17/2016 Tdap 5/17/2016 Not reviewed this visit You Were Diagnosed With   
  
 Codes Comments Back muscle spasm    -  Primary ICD-10-CM: V87.359 ICD-9-CM: 724.8 Abnormal CT scan     ICD-10-CM: R93.8 ICD-9-CM: 793.99 Bone lesion     ICD-10-CM: M89.9 ICD-9-CM: 733.90 Controlled type 2 diabetes mellitus without complication, unspecified long term insulin use status ([facility]     ICD-10-CM: E11.9 ICD-9-CM: 250.00 Vitals BP Pulse Temp Resp Height(growth percentile) Weight(growth percentile) 148/86 (BP 1 Location: Left arm, BP Patient Position: Sitting) (!) 55 97 °F (36.1 °C) (Oral) 20 5' 5\" (1.651 m) 201 lb (91.2 kg) BMI OB Status Smoking Status 33.45 kg/m2 Hysterectomy Current Some Day Smoker Vitals History BMI and BSA Data Body Mass Index Body Surface Area  
 33.45 kg/m 2 2.05 m 2 Preferred Pharmacy Pharmacy Name Phone [address] [facility], [address] Your Updated Medication List  
  
   
This list is accurate as of: 7/11/17 12:02 PM.  Always use your most recent med list.  
  
  
  
  
 ADULT LOW DOSE ASPIRIN 81 mg tablet Generic drug:  aspirin delayed-release Take 1 Tab by mouth daily. albuterol 90 mcg/actuation inhaler Commonly known as:  PROAIR HFA Take 2 Puffs by inhalation every four (4) hours as needed for Wheezing. atorvastatin 10 mg tablet Commonly known as:  LIPITOR Take 1 Tab by mouth nightly. CLARITIN 10 mg tablet Generic drug:  loratadine Take 10 mg by mouth daily. cyclobenzaprine 10 mg tablet Commonly known as:  FLEXERIL Take 1 Tab by mouth three (3) times daily as needed for Muscle Spasm(s). esomeprazole 40 mg capsule Commonly known as:  NEXIUM  
take 1 capsule by mouth once daily  
  
 linaclotide 145 mcg Cap capsule Commonly known as:  [place] Amparo Take 1 Cap by mouth Daily (before breakfast). losartan-hydroCHLOROthiazide 100-25 mg per tablet Commonly known as:  HYZAAR Take 1 Tab by mouth daily. metaxalone 800 mg tablet Commonly known as:  SKELAXIN Take 1 Tab by mouth three (3) times daily. nebivolol 10 mg tablet Commonly known as:  BYSTOLIC Take 1 Tab by mouth daily. We Performed the Following REFERRAL TO PHYSICAL THERAPY [SPG98 Custom] Follow-up Instructions Return in about 2 months (around 9/11/2017). To-Do List   
 07/11/2017 Imaging:  NM BONE SCAN LTD   
  
 07/28/2017 Lab:  MICROALBUMIN, UR, RAND W/ MICROALBUMIN/CREA RATIO Referral Information Referral ID Referred By Referred To  
  
 [id number] PALLAVI LAM IN [facility]. [address] [facility], [address] [place], [address] Phone: [phone] Visits Status Start Date End Date 1 New Request 7/11/17 7/11/18 If your referral has a status of pending review or denied, additional information will be sent to support the outcome of this decision. Patient Instructions Please contact our office if you have any questions about your visit today. Introducing [facility] [place] [facility]! [facility] introduces [employer] patient portal. Now you can access parts of your medical record, email your doctor's office, and request medication refills online. 1. In your internet browser, go to [email or web address] 2. Click on the First Time User? Click Here link in the Sign In box. You will see the New Member Sign Up page. 3. Enter your [employer] Access Code exactly as it appears below. You will not need to use this code after youve completed the sign-up process. If you do not sign up before the expiration date, you must request a new code. · [employer] Access Code: [email or web address] Expires: 8/15/2017 10:46 AM 
 
4. Enter the last four digits of your Social Security Number (xxxx) and Date of Birth (mm/dd/yyyy) as indicated and click Submit. You will be taken to the next sign-up page. 5. Create a [employer] ID. This will be your [employer] login ID and cannot be changed, so think of one that is secure and easy to remember. 6. Create a [employer] password. You can change your password at any time. 7. Enter your Password Reset Question and Answer. This can be used at a later time if you forget your password. 8. Enter your e-mail address. You will receive e-mail notification when new information is available in [address] 9. Click Sign Up. You can now view and download portions of your medical record. 10. Click the Download Summary menu link to download a portable copy of your medical information. If you have questions, please visit the Frequently Asked Questions section of the [employer] website. Remember, [employer] is NOT to be used for urgent needs. For medical emergencies, dial 911. Now available from your iPhone and Android! Please provide this summary of care documentation to your next provider. Your primary care clinician is listed as PALLAVI LAM. If you have any questions after today's visit, please call [phone].

## 2017-07-11 NOTE — PROGRESS NOTES
HISTORY OF PRESENT ILLNESS  Raul Mota is a 61 y.o. female. Chief Complaint   Patient presents with    Follow-up     right flank pain, back pain and spasms, continued pain meds not effective    Results     discuss CT results   Review labs    HPI  Past Medical History:   Diagnosis Date    Asthma     Constipation     GERD (gastroesophageal reflux disease)     Hypercholesterolemia     Hypertension      Current Outpatient Prescriptions   Medication Sig Dispense Refill    cyclobenzaprine (FLEXERIL) 10 mg tablet Take 1 Tab by mouth three (3) times daily as needed for Muscle Spasm(s). 30 Tab 0    metaxalone (SKELAXIN) 800 mg tablet Take 1 Tab by mouth three (3) times daily. 30 Tab 0    losartan-hydroCHLOROthiazide (HYZAAR) 100-25 mg per tablet Take 1 Tab by mouth daily. 90 Tab 3    atorvastatin (LIPITOR) 10 mg tablet Take 1 Tab by mouth nightly. 90 Tab 3    nebivolol (BYSTOLIC) 10 mg tablet Take 1 Tab by mouth daily. 90 Tab 3    esomeprazole (NEXIUM) 40 mg capsule take 1 capsule by mouth once daily 30 Cap 3    linaclotide (LINZESS) 145 mcg cap capsule Take 1 Cap by mouth Daily (before breakfast). 90 Cap 3    albuterol (PROAIR HFA) 90 mcg/actuation inhaler Take 2 Puffs by inhalation every four (4) hours as needed for Wheezing. 3 Inhaler 0    aspirin delayed-release (ADULT LOW DOSE ASPIRIN) 81 mg tablet Take 1 Tab by mouth daily. 30 Tab prn    loratadine (CLARITIN) 10 mg tablet Take 10 mg by mouth daily. Allergies   Allergen Reactions    Lisinopril Other (comments)     Pt states gave her stomach cramps       Review of Systems   Musculoskeletal: Positive for back pain and myalgias. Visit Vitals    /86 (BP 1 Location: Left arm, BP Patient Position: Sitting)  Comment: manual    Pulse (!) 55    Temp 97 °F (36.1 °C) (Oral)    Resp 20    Ht 5' 5\" (1.651 m)    Wt 201 lb (91.2 kg)    BMI 33.45 kg/m2       Physical Exam   Constitutional: She appears well-developed and well-nourished.  No distress. Cardiovascular: Normal rate, regular rhythm and normal heart sounds. Pulmonary/Chest: Effort normal and breath sounds normal. No respiratory distress. She has no wheezes. She has no rales. Abdominal: Soft. Bowel sounds are normal. She exhibits no distension. There is no tenderness. There is no rebound. Musculoskeletal: She exhibits tenderness. She exhibits no edema. Neurological: Coordination normal.   Skin: No pallor. Nursing note and vitals reviewed. PROCEDURE:  CT Abdomen, Pelvis without and with Contrast.     INDICATION:  Persistent right flank pain. Hematuria. Right sided low back  pain.     COMPARISON:  None.     TECHNIQUE:  Helical volumetric CT imaging of the abdomen and pelvis is performed  before and after IV contrast administration. Coronal and sagittal multiplanar  reconstruction images are generated for improved anatomic delineation.     - IV contrast dose:  100 mL Isovue-300. - Radiation dose:  DLP 1395 mGy-cm.  - All CT scans at this facility are performed using dose optimization technique  as appropriate to the performed exam, to include automated exposure control,  adjustment of the mA and/or kV according to patient's size (Including  appropriate matching for site-specific examinations), or use of iterative  reconstruction technique.     FINDINGS:     ABDOMEN     Lung Bases:  Clear except for a focus of scarring in the left lung base.     Liver:  Mild hepatic steatosis. Otherwise no acute findings.     Kidneys:  No renal mass or stones. No hydronephrosis. No ureteral dilation.     Pancreas, Spleen, Gallbladder, Adrenal Glands:  Unremarkable.     Gastrointestinal Tract, Abdominal Wall, Mesentery:       - Small hiatal hernia. Otherwise unremarkable stomach. - No acute findings along the small bowel. No small bowel obstruction.    - Normal appendix.    - No acute diverticulitis or colitis. - No mesenteric adenopathy.     Retroperitoneum:  No adenopathy.    Vascular:  Non-aneurysmal aorta.     PELVIS     Urinary Bladder:  Partially contracted. Unremarkable.     Uterus, Adnexa:  S/p hysterectomy. No adnexal mass.     Rectum:  Unremarkable.     Pelvic sidewall:  No adenopathy. No free fluid.     Bones:  2.5 x 2.4 cm sclerotic density in the right superior pubic ramus near  the symphysis pubis. No acute findings elsewhere.     IMPRESSION  IMPRESSION:     1. No acute renal abnormalities or ureteral stone or post-obstructive changes.      2. No acute findings along the gastrointestinal tract.     3. Focal 2.5 x 2.4 cm sclerotic density in the right superior pubic ramus near  symphysis pubis. Query post-traumatic healing related changes vs. atypically  large bone island. Bone metastasis may be suspected if the patient has a known  underlying primary neoplasm. Bone scan would be helpful for further  delineation. Component      Latest Ref Rng & Units 5/17/2017 4/12/2017   RBC      4.20 - 5.30 M/uL 4.32    HGB      12.0 - 16.0 g/dL 12.4    HCT      35.0 - 45.0 % 37.9    MCV      74.0 - 97.0 FL 87.7    MCH      24.0 - 34.0 PG 28.7    MCHC      31.0 - 37.0 g/dL 32.7    RDW      11.6 - 14.5 % 14.2    PLATELET      953 - 476 K/uL 221    MPV      9.2 - 11.8 FL 10.7    NEUTROPHILS      40 - 73 % 48    LYMPHOCYTES      21 - 52 % 42    MONOCYTES      3 - 10 % 6    EOSINOPHILS      0 - 5 % 3    BASOPHILS      0 - 2 % 1    ABS. NEUTROPHILS      1.8 - 8.0 K/UL 3.4    ABS. LYMPHOCYTES      0.9 - 3.6 K/UL 3.0    ABS. MONOCYTES      0.05 - 1.2 K/UL 0.4    ABS. EOSINOPHILS      0.0 - 0.4 K/UL 0.2    ABS.  BASOPHILS      0.0 - 0.06 K/UL 0.1 (H)    DF       AUTOMATED    Sodium      136 - 145 mmol/L  140   Potassium      3.5 - 5.5 mmol/L  3.4 (L)   Chloride      100 - 108 mmol/L  101   CO2      21 - 32 mmol/L  32   Anion gap      3.0 - 18 mmol/L  7   Glucose      74 - 99 mg/dL  118 (H)   BUN      7.0 - 18 MG/DL  18   Creatinine      0.6 - 1.3 MG/DL  0.91   BUN/Creatinine ratio      12 - 20    20   GFR est AA      >60 ml/min/1.73m2  >60   GFR est non-AA      >60 ml/min/1.73m2  >60   Calcium      8.5 - 10.1 MG/DL  9.0   Bilirubin, total      0.2 - 1.0 MG/DL  0.3   ALT (SGPT)      13 - 56 U/L  26   AST      15 - 37 U/L  17   Alk. phosphatase      45 - 117 U/L  70   Protein, total      6.4 - 8.2 g/dL  7.5   Albumin      3.4 - 5.0 g/dL  3.9   Globulin      2.0 - 4.0 g/dL  3.6   A-G Ratio      0.8 - 1.7    1.1   Cholesterol, total      <200 MG/DL  146   Triglyceride      <150 MG/DL  114   HDL Cholesterol      40 - 60 MG/DL  54   LDL, calculated      0 - 100 MG/DL  69.2   VLDL, calculated      MG/DL  22.8   CHOL/HDL Ratio      0 - 5.0    2.7   Hemoglobin A1c, (calculated)      4.2 - 5.6 %  7.1 (H)   Vitamin B12      211 - 911 pg/mL 665    Vitamin D 25-Hydroxy      30 - 100 ng/mL 13.7 (L)    TSH      0.36 - 3.74 uIU/mL 0.75        ASSESSMENT and PLAN    ICD-10-CM ICD-9-CM    1. Back muscle spasm M62.830 724.8 REFERRAL TO PHYSICAL THERAPY   2. Abnormal CT scan R93.8 793.99 NM BONE SCAN WH BODY      : NM BONE SCAN LTD   3. Bone lesion M89.9 733.90 NM BONE SCAN WH BODY      : NM BONE SCAN LTD   4. Controlled type 2 diabetes mellitus without complication, unspecified long term insulin use status (HCC) E11.9 250.00 MICROALBUMIN, UR, RAND W/ MICROALBUMIN/CREA RATIO   5. Vitamin D deficiency E55.9 268.9 Cholecalciferol, Vitamin D3, (VITAMIN D3) 2,000 unit cap capsule   Visit based of time 25 minutes total,  with more than 50% of the face-to-face visit time spent in counseling on lab and ct review, abnormality on ct, muscle spasm it's treatment, prognosis, management advise, plan and follow-up recommendations. Follow-up Disposition:  Return in about 2 months (around 9/11/2017).

## 2017-07-12 ENCOUNTER — TELEPHONE (OUTPATIENT)
Dept: FAMILY MEDICINE CLINIC | Age: 57
End: 2017-07-12

## 2017-07-12 NOTE — TELEPHONE ENCOUNTER
Pt called stated that her labs came back with her Vitamin D levels being low.  Pt would like to know instead of getting an over the counter vitamin D supplement could she get a prescription for Vitamin D.

## 2017-07-18 RX ORDER — ACETAMINOPHEN 500 MG
2000 TABLET ORAL DAILY
Qty: 30 CAP | Refills: 6 | Status: SHIPPED | OUTPATIENT
Start: 2017-07-18

## 2017-07-24 ENCOUNTER — HOSPITAL ENCOUNTER (OUTPATIENT)
Dept: NUCLEAR MEDICINE | Age: 57
Discharge: HOME | End: 2017-07-24
Attending: FAMILY MEDICINE
Payer: COMMERCIAL

## 2017-07-24 DIAGNOSIS — M89.9 BONE LESION: ICD-10-CM

## 2017-07-24 DIAGNOSIS — R93.89 ABNORMAL CT SCAN: ICD-10-CM

## 2017-07-24 PROCEDURE — 78306 BONE IMAGING WHOLE BODY: CPT

## 2017-08-01 ENCOUNTER — APPOINTMENT (OUTPATIENT)
Dept: PHYSICAL THERAPY | Age: 57
End: 2017-08-01

## 2017-09-13 ENCOUNTER — HOSPITAL ENCOUNTER (OUTPATIENT)
Dept: PHYSICAL THERAPY | Age: 57
Discharge: HOME | End: 2017-09-13
Payer: COMMERCIAL

## 2017-09-13 PROCEDURE — 97140 MANUAL THERAPY 1/> REGIONS: CPT

## 2017-09-13 PROCEDURE — 97110 THERAPEUTIC EXERCISES: CPT

## 2017-09-13 PROCEDURE — 97161 PT EVAL LOW COMPLEX 20 MIN: CPT

## 2017-09-13 NOTE — PROGRESS NOTES
[facility] - [facility]              [address]        [place], [address] [place]   [phone] fax    Plan of Care/ Statement of Necessity for Physical Therapy Services  Patient name: Robert Pollock Start of Care: 2017   Referral source: Sandra Escobedo MD : 1960    Medical Diagnosis: Muscle spasm of back [M62.830]   Onset Date:5-6 months prior to I.E. Treatment Diagnosis: R Thoracolumbar Pain   Prior Hospitalization: see medical history Provider#: [id number]   Medications: Verified on Patient summary List    Comorbidities: Back Pain, BMI > 30, HTN   Prior Level of Function: No limitation with completion of work-related duties and functional ADLs, Walk (3x/week, 1 mile), No previous history of LBP     The Plan of Care and following information is based on the information from the initial evaluation. Assessment:    Patient presents with a 5-6 month history of R thoracolumbar pain of insidious onset with patient denying any functional limitations secondarily but reporting constant pain of high intensity (NPRS 6-8/10). Patient without the following red flag findings: (-) constitutional signs/symptoms, (-) pain/difficulty with urination, (-) unexpected weight change and (-) unrelenting night pain. Despite high pain intensity patient denies sleep disturbances with patient without medication usage reported with patient reporting no functional limitations but discomfort with completion of work-related duties (i.e. Squatting, lifting) and with prolonged ambulation. Observationally patient with posture significant for increased lumbar lordosis with patient noted with a L lateral shift with completion of lumbar AROM within the sagittal plane. Increase in R thoracolumbar pain with bilateral lumbar sidebend and lumbar extension. Patient noted with (-) R hip screen but demonstrates tightness of the R hip region with respect to the L.  Patient with a (-) neurological screen and normal, symmetrical strength of bilateral LE. Pain reproduction reported with completion of central and R unilateral lumbar PA at L4-L5 with reduction in pain reported in R lumbar sidebend and lumbar extension after completion of manual.    Patient will continue to benefit from skilled PT services to modify and progress therapeutic interventions, address functional mobility deficits, address ROM deficits, address strength deficits, analyze and address soft tissue restrictions, analyze and cue movement patterns and analyze and modify body mechanics/ergonomics to attain remaining goals. Primary emphasis of treatment to be placed on improving flexibility of the lumbar spine and surrounding musculature as well as musculature of the R hip complex in order to improve ease with work-related duties and allow patient to ambulate for exercise without pain increase. Patient may be limited in attendance secondary to financial concerns. Key Information:    BP: 138/88 mmHg  HR: 56 bpm     Posture: Increased lumbar lordosis with symmetrical positioning of bilateral ASIS and iliac crest     Gait:   [x] Normal                     [] Abnormal:     Functional Tests:  1. SLS: L (no increase in pain) / R (no increase in pain)     Active Movements: [] N/A   [] Too acute   [] Other:  ROM % AROM Comments   Forward flexion 40-60 0% limited L lateral shift;  No pain only R \"pulling\"   Extension 20-30 25% limited L lateral shift, Increase in R LBP   SB right 20-30 25% limited Increase in R LBP   SB left 20-30 25% limited R low back \"pulling      Neuro Screen [] WNL  Reflexes: L/R Patellar 2+, L Achilles 1+, R Achilles 2+     Palpation: No TTP L/R QL      Joint Mobility                        Central Lumbar PA: Painful L4-L5, L2                        Unilateral Lumbar PA: Painful R L4-L5     LE MMT      Left Right   Hip Flexion 5 5     Extension 5 5     Abduction 5       ER 5 5     IR 5 5   Knee Flexion 5 5     Extension 5 5 Ankle Dorsiflexion 5 5   *Assessed in supine     Special Tests       Hip:                      Adrián Maxcy:                                  [x] R    [] L    [] +    [x] -                                    Scour:                                  [x] R    [] L    [] +    [x] -                                    FADDIR:                               [x] R    [] L    [] +    [x] -           Deficits: Hamstrings 90/90: L (normal) / R (slight limitation)    Evaluation Complexity History MEDIUM  Complexity : 1-2 comorbidities / personal factors will impact the outcome/ POC ; Examination MEDIUM Complexity : 3 Standardized tests and measures addressing body structure, function, activity limitation and / or participation in recreation  ;Presentation LOW Complexity : Stable, uncomplicated  ;Clinical Decision Making MEDIUM Complexity : FOTO score of 26-74  Overall Complexity Rating: LOW   Problem List: pain affecting function, decrease ROM, decrease strength, impaired gait/ balance, decrease ADL/ functional abilitiies, decrease activity tolerance, decrease flexibility/ joint mobility and decrease transfer abilities   Treatment Plan may include any combination of the following: Therapeutic exercise, Therapeutic activities, Neuromuscular re-education, Gait/balance training, Manual therapy, Patient education, Self Care training, Functional mobility training and Stair training  Patient / Family readiness to learn indicated by: asking questions, trying to perform skills and interest  Persons(s) to be included in education: patient (P)  Barriers to Learning/Limitations: None  Patient Goal (s): May the pain go away  Patient Self Reported Health Status: good  Rehabilitation Potential: good    Short Term Goals: To be accomplished in 2-3 weeks:  1. Patient will subjectively report full compliance with prescribed HEP.   2. Patient will demonstrate a 25% improvement in pain-free L/R lumbar sidebend AROM in order to improve ease with donning/doffing pants. 3. Patient will demonstrate ability to perform lumbar flexion AROM within the sagittal plane without deviation in order to decrease risk for future injury with work-related duties. Long Term Goals: To be accomplished in 5 weeks:  1. Patient will demonstrate a significant functional improvement as demonstrated by a score of >/=73 on FOTO. 2. Patient will subjectively report return to ambulation program for exercise 3x/week without pain reproduction in order to improve quality of life. 3. Patient will report pain at maximum over the last week NPRS 4/10 in order to improve overall quality of life. Frequency / Duration: Patient to be seen 2 times per week for 5 weeks. Patient/ Caregiver education and instruction: Diagnosis, prognosis, self care, activity modification and exercises   [x]  Plan of care has been reviewed with BHAVESH Albright, PT 9/13/2017 1:39 PM  ________________________________________________________________________    I certify that the above Therapy Services are being furnished while the patient is under my care. I agree with the treatment plan and certify that this therapy is necessary.     [de-identified] Signature:____________________  Date:____________Time: _________    Please sign and return to [facility] - [address], [address] [place]   [phone] fax

## 2017-09-13 NOTE — PROGRESS NOTES
PT DAILY TREATMENT NOTE     Patient Name: Manan Garcia  Date:2017  : 1960  [x]  Patient  Verified  Payor: [employer] [place] / Plan: [address] [place] [address] / Product Type: PPO /    In time:235  Out time:315  Total Treatment Time (min): 40  Visit #: 1 of 10    Treatment Area: Muscle spasm of back [M62.830]    Physical Therapy Evaluation - Lumbar    SUBJECTIVE      Any medication changes, allergies to medications, adverse drug reactions, diagnosis change, or new procedure performed?: [x] No    [] Yes (see summary sheet for update)    Subjective functional status/changes:     PLOF: No limitation with completion of work-related duties and functional ADLs, Walk (3x/week, 1 mile), No previous history of LBP  Current Functional Status: Discomfort with prolonged ambulation, Discomfort with work-related duties (squatting, lifting), Difficulty with stairs  Work Hx: [employer], (Job Duties: Lifting, Driving Equipment, Squatting)  Living Situation: 1-story ([address])  Comorbidities: Back Pain, BMI > 30, HTN  Medications: No medication taken for R LBP    Patient Goals: \"May the pain go away\"    OBJECTIVE EXAMINATION    OBJECTIVE  BP: 138/88 mmHg  HR: 56 bpm    Posture: Increased lumbar lordosis with symmetrical positioning of bilateral ASIS and iliac crest    Gait:  [x] Normal     [] Abnormal:    Functional Tests:  1. SLS: L (no increase in pain) / R (no increase in pain)    Active Movements: [] N/A   [] Too acute   [] Other:  ROM % AROM Comments   Forward flexion 40-60 0% limited L lateral shift;  No pain only R \"pulling\"   Extension 20-30 25% limited L lateral shift, Increase in R LBP   SB right 20-30 25% limited Increase in R LBP   SB left 20-30 25% limited R low back \"pulling     Neuro Screen [] WNL  Reflexes: L/R Patellar 2+, L Achilles 1+, R Achilles 2+    Palpation: No TTP L/R QL     Joint Mobility    Central Lumbar PA: Painful L4-L5, L2   Unilateral Lumbar PA: Painful R L4-L5    LE MMT    Left Right   Hip Flexion 5 5    Extension 5 5    Abduction 5     ER 5 5    IR 5 5   Knee Flexion 5 5    Extension 5 5   Ankle Dorsiflexion 5 5   *Assessed in supine    Special Tests       Hip: Jessika Coventry:  [x] R    [] L    [] +    [x] -     Scour:  [x] R    [] L    [] +    [x] -     FADDIR: [x] R    [] L    [] +    [x] -          Deficits:      Hamstrings 90/90: L (normal) / R (slight limitation)         OBJECTIVE    24 min [x]Eval                  []Re-Eval     8 min Therapeutic Exercise:  [x] See flow sheet : Patient educated in completion of prescribed HEP and provided with written HEP instructions, Patient educated regarding diagnosis and PoC   Rationale: increase ROM and increase strength to improve the patients ability to improve ease with functional lifting    8 min Manual Therapy:    Prone, Central Lumbar Grade III/IV PA L4-L5  Prone, R Unilateral Lumbar Grade III/IV PA L4-L5  Sidelying, L/R Lumbopelvic Manipulation, Thrust x1   Rationale: decrease pain, increase ROM and increase tissue extensibility to improve ease with long-distance ambulation          With   [] TE   [] TA   [] neuro   [] other: Patient Education: [x] Review HEP    [] Progressed/Changed HEP based on:   [] positioning   [] body mechanics   [] transfers   [] heat/ice application    [] other:      Pain Level (0-10 scale) post treatment: 6    ASSESSMENT/Changes in Function: Patient presents with a 5-6 month history of R thoracolumbar pain of insidious onset with patient denying any functional limitations secondarily but reporting constant pain of high intensity (NPRS 6-8/10). Patient without the following red flag findings: (-) constitutional signs/symptoms, (-) pain/difficulty with urination, (-) unexpected weight change and (-) unrelenting night pain.  Despite high pain intensity patient denies sleep disturbances with patient without medication usage reported with patient reporting no functional limitations but discomfort with completion of work-related duties (i.e. Squatting, lifting) and with prolonged ambulation. Observationally patient with posture significant for increased lumbar lordosis with patient noted with a L lateral shift with completion of lumbar AROM within the sagittal plane. Increase in R thoracolumbar pain with bilateral lumbar sidebend and lumbar extension. Patient noted with (-) R hip screen but demonstrates tightness of the R hip region with respect to the L. Patient with a (-) neurological screen and normal, symmetrical strength of bilateral LE. Pain reproduction reported with completion of central and R unilateral lumbar PA at L4-L5 with reduction in pain reported in R lumbar sidebend and lumbar extension after completion of manual.    Patient will continue to benefit from skilled PT services to modify and progress therapeutic interventions, address functional mobility deficits, address ROM deficits, address strength deficits, analyze and address soft tissue restrictions, analyze and cue movement patterns and analyze and modify body mechanics/ergonomics to attain remaining goals. Primary emphasis of treatment to be placed on improving flexibility of the lumbar spine and surrounding musculature as well as musculature of the R hip complex in order to improve ease with work-related duties and allow patient to ambulate for exercise without pain increase. Patient may be limited in attendance secondary to financial concerns. [x]  See Plan of Care  []  See progress note/recertification  []  See Discharge Summary         Progress towards goals / Updated goals:    Short Term Goals: To be accomplished in 2-3 weeks:  1. Patient will subjectively report full compliance with prescribed HEP. Eval: HEP provided  2. Patient will demonstrate a 25% improvement in pain-free L/R lumbar sidebend AROM in order to improve ease with donning/doffing pants. Eval: L/R Lumbar Sidebend AROM 25% limited with R thoracolumbar pain  3.  Patient will demonstrate ability to perform lumbar flexion AROM within the sagittal plane without deviation in order to decrease risk for future injury with work-related duties. Eval: Lumbar Flexion AROM 0% limited with L deviation with completion    Long Term Goals: To be accomplished in 5 weeks:  1. Patient will demonstrate a significant functional improvement as demonstrated by a score of >/=73 on FOTO. Eval: FOTO = 63  2. Patient will subjectively report return to ambulation program for exercise 3x/week without pain reproduction in order to improve quality of life. Eval: Ambulation, 1 mile 3x/week with increase in pain reported  3. Patient will report pain at maximum over the last week NPRS 4/10 in order to improve overall quality of life.    Eval: NPRS Maximum Pain = 8/10    PLAN  [x]  Upgrade activities as tolerated     []  Continue plan of care  []  Update interventions per flow sheet       []  Discharge due to:_  []  Other:_      Claribel Reilly PT 9/13/2017  1:38 PM    Future Appointments  Date Time Provider Anabella Myers   9/13/2017 2:30 PM Claribel Reilly, PT [facility]

## 2017-09-20 ENCOUNTER — HOSPITAL ENCOUNTER (OUTPATIENT)
Dept: PHYSICAL THERAPY | Age: 57
Discharge: HOME | End: 2017-09-20
Payer: COMMERCIAL

## 2017-09-20 PROCEDURE — 97140 MANUAL THERAPY 1/> REGIONS: CPT

## 2017-09-20 PROCEDURE — 97110 THERAPEUTIC EXERCISES: CPT

## 2017-09-20 NOTE — PROGRESS NOTES
PT DAILY TREATMENT NOTE     Patient Name: Zaida Mcnamara  Date:2017  : 1960  [x]  Patient  Verified  Payor: FAISAL YVROSE / Plan: [address] [place] [address] / Product Type: PPO /    In time:8:26  Out time:9:02  Total Treatment Time (min): 36  Visit #: 2 of 10    Treatment Area: Muscle spasm of back [M62.830]    SUBJECTIVE  Pain Level (0-10 scale): 7  Any medication changes, allergies to medications, adverse drug reactions, diagnosis change, or new procedure performed?: [x] No    [] Yes (see summary sheet for update)  Subjective functional status/changes:   [] No changes reported  Pt reports she was walking around the track yesterday and could only make 3 lap not 4 before she started having increase in pain in her right hip and back.      OBJECTIVE        Min Type Additional Details    [] Estim:  []Unatt       []IFC  []Premod                        []Other:  []w/ice   []w/heat  Position:  Location:    [] Estim: []Att    []TENS instruct  []NMES                    []Other:  []w/US   []w/ice   []w/heat  Position:  Location:    []  Traction: [] Cervical       []Lumbar                       [] Prone          []Supine                       []Intermittent   []Continuous Lbs:  [] before manual  [] after manual    []  Ultrasound: []Continuous   [] Pulsed                           []1MHz   []3MHz W/cm2:  Location:    []  Iontophoresis with dexamethasone         Location: [] Take home patch   [] In clinic    []  Ice     []  heat  []  Ice massage  []  Laser   []  Anodyne Position:  Location:    []  Laser with stim  []  Other:  Position:  Location:    []  Vasopneumatic Device Pressure:       [] lo [] med [] hi   Temperature: [] lo [] med [] hi   [] Skin assessment post-treatment:  []intact []redness- no adverse reaction    []redness - adverse reaction:       28 min Therapeutic Exercise:  [x] See flow sheet :   Rationale: increase ROM and increase strength to improve the patients ability to increase tolerance to activities. 8 min Manual Therapy:  STM/TPR B lumbar paraspinals,R QL release, glutes and piriformis, lumbar PA mobs and unilateral mobs. Rationale: decrease pain, increase ROM, increase tissue extensibility and decrease trigger points to increase ease with ADLs. With   [] TE   [] TA   [] neuro   [] other: Patient Education: [x] Review HEP    [] Progressed/Changed HEP based on:   [] positioning   [] body mechanics   [] transfers   [] heat/ice application    [] other:      Other Objective/Functional Measures: Pt reports she has initiated some of her HEP. Pain Level (0-10 scale) post treatment: 7    ASSESSMENT/Changes in Function: Initiated exercises per POC. Pt reported relief after QL release. Patient will continue to benefit from skilled PT services to modify and progress therapeutic interventions, address functional mobility deficits, address ROM deficits, address strength deficits and analyze and address soft tissue restrictions to attain remaining goals. []  See Plan of Care  []  See progress note/recertification  []  See Discharge Summary         Progress towards goals / Updated goals:  Short Term Goals: To be accomplished in 2-3 weeks:  1. Patient will subjectively report full compliance with prescribed HEP. Eval: HEP provided  Current: Progressing: Pt reports she has initiated some of her HEP. 9/20/17  2. Patient will demonstrate a 25% improvement in pain-free L/R lumbar sidebend AROM in order to improve ease with donning/doffing pants. Eval: L/R Lumbar Sidebend AROM 25% limited with R thoracolumbar pain  3. Patient will demonstrate ability to perform lumbar flexion AROM within the sagittal plane without deviation in order to decrease risk for future injury with work-related duties. Eval: Lumbar Flexion AROM 0% limited with L deviation with completion     Long Term Goals: To be accomplished in 5 weeks:  1.  Patient will demonstrate a significant functional improvement as demonstrated by a score of >/=73 on FOTO. Eval: FOTO = 63  2. Patient will subjectively report return to ambulation program for exercise 3x/week without pain reproduction in order to improve quality of life. Eval: Ambulation, 1 mile 3x/week with increase in pain reported  3. Patient will report pain at maximum over the last week NPRS 4/10 in order to improve overall quality of life.    Eval: NPRS Maximum Pain = 8/10    PLAN  []  Upgrade activities as tolerated     [x]  Continue plan of care  []  Update interventions per flow sheet       []  Discharge due to:_  []  Other:_      Sandra Marquez PTA 9/20/2017  8:30 AM    Future Appointments  Date Time Provider Anabella Myers   10/19/2017 2:45 PM [facility]

## 2017-09-25 ENCOUNTER — APPOINTMENT (OUTPATIENT)
Dept: PHYSICAL THERAPY | Age: 57
End: 2017-09-25
Payer: COMMERCIAL

## 2017-10-19 ENCOUNTER — OFFICE VISIT (OUTPATIENT)
Dept: FAMILY MEDICINE CLINIC | Age: 57
End: 2017-10-19

## 2017-10-19 VITALS
HEART RATE: 63 BPM | HEIGHT: 65 IN | SYSTOLIC BLOOD PRESSURE: 140 MMHG | BODY MASS INDEX: 33.32 KG/M2 | RESPIRATION RATE: 20 BRPM | DIASTOLIC BLOOD PRESSURE: 81 MMHG | TEMPERATURE: 97.3 F | WEIGHT: 200 LBS

## 2017-10-19 DIAGNOSIS — M77.11 LATERAL EPICONDYLITIS OF RIGHT ELBOW: ICD-10-CM

## 2017-10-19 DIAGNOSIS — I10 ESSENTIAL HYPERTENSION: ICD-10-CM

## 2017-10-19 DIAGNOSIS — E78.5 HYPERLIPIDEMIA, UNSPECIFIED HYPERLIPIDEMIA TYPE: ICD-10-CM

## 2017-10-19 DIAGNOSIS — E11.9 CONTROLLED TYPE 2 DIABETES MELLITUS WITHOUT COMPLICATION, UNSPECIFIED LONG TERM INSULIN USE STATUS: ICD-10-CM

## 2017-10-19 DIAGNOSIS — E55.9 VITAMIN D DEFICIENCY: ICD-10-CM

## 2017-10-19 DIAGNOSIS — M19.90 OSTEOARTHRITIS, UNSPECIFIED OSTEOARTHRITIS TYPE, UNSPECIFIED SITE: Primary | ICD-10-CM

## 2017-10-19 DIAGNOSIS — E87.6 HYPOKALEMIA: ICD-10-CM

## 2017-10-19 DIAGNOSIS — Z51.81 MEDICATION MONITORING ENCOUNTER: ICD-10-CM

## 2017-10-19 NOTE — PROGRESS NOTES
HISTORY OF PRESENT ILLNESS  Patricia Mckeon is a 61 y.o. female. Chief Complaint   Patient presents with    Spasms back did not go to PT    Diabetes Chronic problem, uncontrolled mild last ck 7.1 labs past due    Vitamin D Deficiency   complains of pain in arm , forearm and elbow, new No injury or trauma. HPI  Past Medical History:   Diagnosis Date    Asthma     Constipation     GERD (gastroesophageal reflux disease)     Hypercholesterolemia     Hypertension      Current Outpatient Prescriptions   Medication Sig Dispense Refill    Cholecalciferol, Vitamin D3, (VITAMIN D3) 2,000 unit cap capsule Take 2,000 Units by mouth daily. 30 Cap 6    losartan-hydroCHLOROthiazide (HYZAAR) 100-25 mg per tablet Take 1 Tab by mouth daily. 90 Tab 3    atorvastatin (LIPITOR) 10 mg tablet Take 1 Tab by mouth nightly. 90 Tab 3    nebivolol (BYSTOLIC) 10 mg tablet Take 1 Tab by mouth daily. 90 Tab 3    esomeprazole (NEXIUM) 40 mg capsule take 1 capsule by mouth once daily 30 Cap 3    linaclotide (LINZESS) 145 mcg cap capsule Take 1 Cap by mouth Daily (before breakfast). 90 Cap 3    albuterol (PROAIR HFA) 90 mcg/actuation inhaler Take 2 Puffs by inhalation every four (4) hours as needed for Wheezing. 3 Inhaler 0    aspirin delayed-release (ADULT LOW DOSE ASPIRIN) 81 mg tablet Take 1 Tab by mouth daily. 30 Tab prn    loratadine (CLARITIN) 10 mg tablet Take 10 mg by mouth daily. Allergies   Allergen Reactions    Lisinopril Other (comments)     Pt states gave her stomach cramps       Review of Systems   Musculoskeletal: Positive for back pain, joint pain and myalgias. Neurological: Negative for tingling and focal weakness. Visit Vitals    /81 (BP 1 Location: Right arm, BP Patient Position: Sitting)    Pulse 63    Temp 97.3 °F (36.3 °C) (Oral)    Resp 20    Ht 5' 5\" (1.651 m)    Wt 200 lb (90.7 kg)    BMI 33.28 kg/m2       Physical Exam.Nursing note and vitals reviewed.   Constitutional: She is oriented to person, place, and time. She appears well-developed and well-nourished. No distress. HENT:   Mouth/Throat: Oropharynx is clear and moist.   Neck: No JVD present. No thyromegaly present. Cardiovascular: Normal rate, regular rhythm and normal heart sounds. Pulmonary/Chest: Effort normal and breath sounds normal. No respiratory distress. She has no wheezes. She has no rales. Musculoskeletal: She exhibits no edema. Lymphadenopathy:     She has no cervical adenopathy. Neurological: She is alert and oriented to person, place, and time. Coordination normal.   Psychiatric: She has a normal mood and affect. Her behavior is normal.    History: Abnormal CT scan     Comparison: No prior relevant studies for comparison.     Technique:     After administration of 74.5 millicuries of technetium 99m MDP intravenously  right antecubital fossa vein, multiple views of the osseous system were  obtained. No comparison study available.     Findings:     Increased uptake left AC joint, right sternoclavicular joint, both knees,  thoracic spine, right lumbosacral junction, and feet are all likely degenerative  in origin. There is no abnormal uptake involving the region of the pubic symphysis. Both kidneys as well as the bladder are well seen.     IMPRESSION  Impression:     The sclerotic lesion involving the right pubic symphysis most likely represents  a bone island given lack of any significant radiopharmaceutical uptake on the  bone scan. Multiple sites of probable degenerative uptake as described above. ASSESSMENT and PLAN    ICD-10-CM ICD-9-CM    1. Osteoarthritis, unspecified osteoarthritis type, unspecified site reviewed bone scan M19.90 715.90    2. Lateral epicondylitis of right elbow new rec nsaids M77.11 726.32    3. Essential hypertension uncontrolled Patient is asked to monitor BP at home or work, several times per month and return with written values at next office visit.   D99 749.7 METABOLIC PANEL, COMPREHENSIVE      VITAMIN B12      MAGNESIUM   4. Controlled type 2 diabetes mellitus without complication, unspecified long term insulin use status ([facility] Check labs on follow up     as past due D75.5 358.49 METABOLIC PANEL, COMPREHENSIVE      VITAMIN B12      MAGNESIUM      HEMOGLOBIN A1C W/O EAG      MICROALBUMIN, UR, RAND W/ MICROALBUMIN/CREA RATIO   5.  Hypokalemia Check labs on follow up   F78.3 918.5 METABOLIC PANEL, COMPREHENSIVE      VITAMIN B12      MAGNESIUM   6. Hyperlipidemia, unspecified hyperlipidemia type E78.5 272.4 LIPID PANEL      METABOLIC PANEL, COMPREHENSIVE      VITAMIN B12      MAGNESIUM   7. Vitamin D deficiency I19.4 492.5 METABOLIC PANEL, COMPREHENSIVE      VITAMIN B12      MAGNESIUM      VITAMIN D, 25 HYDROXY   8. Medication monitoring encounter Z51.81 V58.83 LIPID PANEL      METABOLIC PANEL, COMPREHENSIVE      VITAMIN B12      MAGNESIUM      VITAMIN D, 25 HYDROXY      HEMOGLOBIN A1C W/O EAG      MICROALBUMIN, UR, RAND W/ MICROALBUMIN/CREA RATIO

## 2017-10-19 NOTE — MR AVS SNAPSHOT
Visit Information Date & Time Provider Department Dept. Phone Encounter #  
 10/19/2017  2:45 PM Trinity Mendez MD [address] [phone] [id number] Follow-up Instructions Return in about 6 weeks (around 11/30/2017). Upcoming Health Maintenance Date Due  
 FOOT EXAM Q1 12/11/1969 FOBT Q 1 YEAR AGE 50-75 12/11/2009 EYE EXAM RETINAL OR DILATED Q1 4/27/2017 INFLUENZA AGE 9 TO ADULT 8/1/2017 MICROALBUMIN Q1 8/8/2017 HEMOGLOBIN A1C Q6M 10/12/2017 LIPID PANEL Q1 4/12/2018 BREAST CANCER SCRN MAMMOGRAM 6/21/2018 PAP AKA CERVICAL CYTOLOGY 8/24/2019 DTaP/Tdap/Td series (2 - Td) 5/17/2026 Allergies as of 10/19/2017  Review Complete On: 10/19/2017 By: Trinity Mendez MD  
  
 Severity Noted Reaction Type Reactions Lisinopril  09/22/2014    Other (comments) Pt states gave her stomach cramps Current Immunizations  Reviewed on 5/17/2016 Name Date Influenza Vaccine 10/3/2014 Pneumococcal Polysaccharide (PPSV-23) 5/17/2016 Tdap 5/17/2016 Not reviewed this visit You Were Diagnosed With   
  
 Codes Comments Osteoarthritis, unspecified osteoarthritis type, unspecified site    -  Primary ICD-10-CM: M19.90 ICD-9-CM: 715.90 Lateral epicondylitis of right elbow     ICD-10-CM: M77.11 ICD-9-CM: 726.32 Essential hypertension     ICD-10-CM: I10 
ICD-9-CM: 401.9 Controlled type 2 diabetes mellitus without complication, unspecified [facility]     ICD-10-CM: E11.9 ICD-9-CM: 250.00 Hypokalemia     ICD-10-CM: E87.6 ICD-9-CM: 276.8 Hyperlipidemia, unspecified hyperlipidemia type     ICD-10-CM: E78.5 ICD-9-CM: 272.4 Vitamin D deficiency     ICD-10-CM: E55.9 ICD-9-CM: 268.9 Medication monitoring encounter     ICD-10-CM: Z51.81 
ICD-9-CM: V58.83 Vitals BP Pulse Temp Resp Height(growth percentile) Weight(growth percentile) 140/81 (BP 1 Location: Right arm, BP Patient Position: Sitting) 63 97.3 °F (36.3 °C) (Oral) 20 5' 5\" (1.651 m) 200 lb (90.7 kg) BMI OB Status Smoking Status 33.28 kg/m2 Hysterectomy Current Some Day Smoker BMI and BSA Data Body Mass Index Body Surface Area  
 33.28 kg/m 2 2.04 m 2 Preferred Pharmacy Pharmacy Name Phone [facility], [address] Your Updated Medication List  
  
   
This list is accurate as of: 10/19/17  3:52 PM.  Always use your most recent med list.  
  
  
  
  
 ADULT LOW DOSE ASPIRIN 81 mg tablet Generic drug:  aspirin delayed-release Take 1 Tab by mouth daily. albuterol 90 mcg/actuation inhaler Commonly known as:  PROAIR HFA Take 2 Puffs by inhalation every four (4) hours as needed for Wheezing. atorvastatin 10 mg tablet Commonly known as:  LIPITOR Take 1 Tab by mouth nightly. Cholecalciferol (Vitamin D3) 2,000 unit Cap capsule Commonly known as:  VITAMIN D3 Take 2,000 Units by mouth daily. CLARITIN 10 mg tablet Generic drug:  loratadine Take 10 mg by mouth daily. esomeprazole 40 mg capsule Commonly known as:  NEXIUM  
take 1 capsule by mouth once daily  
  
 linaclotide 145 mcg Cap capsule Commonly known as:  Ronna Raegan Take 1 Cap by mouth Daily (before breakfast). losartan-hydroCHLOROthiazide 100-25 mg per tablet Commonly known as:  HYZAAR Take 1 Tab by mouth daily. nebivolol 10 mg tablet Commonly known as:  BYSTOLIC Take 1 Tab by mouth daily. Follow-up Instructions Return in about 6 weeks (around 11/30/2017). To-Do List   
 12/01/2017 Lab:  HEMOGLOBIN A1C W/O EAG   
  
 12/01/2017 Lab:  LIPID PANEL   
  
 12/01/2017 Lab:  MAGNESIUM   
  
 12/01/2017 Lab:  METABOLIC PANEL, COMPREHENSIVE   
  
 12/01/2017 Lab:  MICROALBUMIN, UR, RAND W/ MICROALBUMIN/CREA RATIO   
  
 12/01/2017 Lab: VITAMIN B12   
  
 12/01/2017 Lab:  VITAMIN D, 25 HYDROXY Patient Instructions Please contact our office if you have any questions about your visit today. Introducing [facility]! Adri Daniels introduces [employer] patient portal. Now you can access parts of your medical record, email your doctor's office, and request medication refills online. 1. In your internet browser, go to [email or web address] 2. Click on the First Time User? Click Here link in the Sign In box. You will see the New Member Sign Up page. 3. Enter your [employer] Access Code exactly as it appears below. You will not need to use this code after youve completed the sign-up process. If you do not sign up before the expiration date, you must request a new code. · [employer] Access Code: [email or web address] Expires: 11/11/2017  3:07 PM 
 
4. Enter the last four digits of your Social Security Number (xxxx) and Date of Birth (mm/dd/yyyy) as indicated and click Submit. You will be taken to the next sign-up page. 5. Create a [employer] ID. This will be your [employer] login ID and cannot be changed, so think of one that is secure and easy to remember. 6. Create a [employer] password. You can change your password at any time. 7. Enter your Password Reset Question and Answer. This can be used at a later time if you forget your password. 8. Enter your e-mail address. You will receive e-mail notification when new information is available in [address]. 9. Click Sign Up. You can now view and download portions of your medical record. 10. Click the Download Summary menu link to download a portable copy of your medical information. If you have questions, please visit the Frequently Asked Questions section of the [employer] website. Remember, [employer] is NOT to be used for urgent needs. For medical emergencies, dial 911. Now available from your iPhone and Android! Please provide this summary of care documentation to your next provider. Your primary care clinician is listed as PALLAVI LAM. If you have any questions after today's visit, please call [phone].

## 2017-10-19 NOTE — PROGRESS NOTES
Chief Complaint   Patient presents with    Spasms    Diabetes    Vitamin D Deficiency       Health Maintenance Due   Topic Date Due    FOOT EXAM Q1  12/11/1969    FOBT Q 1 YEAR AGE 50-75  12/11/2009    EYE EXAM RETINAL OR DILATED Q1  04/27/2017    INFLUENZA AGE 9 TO ADULT  08/01/2017    MICROALBUMIN Q1  08/08/2017    HEMOGLOBIN A1C Q6M  10/12/2017       Health Maintenance reviewed     1. Have you been to the ER, urgent care clinic since your last visit? Hospitalized since your last visit? No    2. Have you seen or consulted any other health care providers outside of the [address] [place] [address] since your last visit? Include any pap smears or colon screening.  No    Per Verbal order from Yoana Schultz MD   to remove Flexeril and Skelaxin from the patients medication list.

## 2017-10-24 NOTE — PROGRESS NOTES
[facility] - [facility]              [address]        [place], [address] [place]   [phone] fax    Discharge Summary  Patient name: Gilbert Campos Start of Care: 2017   Referral source: David Yu MD : 1960   Medical/Treatment Diagnosis: Muscle spasm of back [M62.830] Onset Date:5-6 months prior to I.E. Prior Hospitalization: see medical history Provider#: [id number]   Medications: Verified on Patient Summary List     Comorbidities: Back Pain, BMI > 30, HTN   Prior Level of Function: No limitation with completion of work-related duties and functional ADLs, Walk (3x/week, 1 mile), No previous history of LBP  Visits from Start of Care: 2    Missed Visits: 1  Reporting Period : 2017 to 2017    Summary of Care:    Short Term Goals: To be accomplished in 2-3 weeks:  1. Patient will subjectively report full compliance with prescribed HEP. Eval: HEP provided  At DC: Progressing: Pt reports she has initiated some of her HEP. 2. Patient will demonstrate a 25% improvement in pain-free L/R lumbar sidebend AROM in order to improve ease with donning/doffing pants. Eval: L/R Lumbar Sidebend AROM 25% limited with R thoracolumbar pain  At DC: Inability to assess secondary to unplanned discharge  3. Patient will demonstrate ability to perform lumbar flexion AROM within the sagittal plane without deviation in order to decrease risk for future injury with work-related duties. Eval: Lumbar Flexion AROM 0% limited with L deviation with completion  At DC: Inability to assess secondary to unplanned discharge      Long Term Goals: To be accomplished in 5 weeks:  1. Patient will demonstrate a significant functional improvement as demonstrated by a score of >/=73 on FOTO. Eval: FOTO = 63  At DC: Inability to assess secondary to unplanned discharge  2.  Patient will subjectively report return to ambulation program for exercise 3x/week without pain reproduction in order to improve quality of life. Eval: Ambulation, 1 mile 3x/week with increase in pain reported  At DC: Inability to assess secondary to unplanned discharge  3. Patient will report pain at maximum over the last week NPRS 4/10 in order to improve overall quality of life. Eval: NPRS Maximum Pain = 8/10  At DC: Inability to assess secondary to unplanned discharge    ASSESSMENT/RECOMMENDATIONS:    Patient to be discharge secondary to non-compliance with Physical Therapy plan of care with patient attendance to only one follow up appointment after the completion of initial evaluation. Attempted to contact patient on 3 occasions in order to schedule patient with inability to successfully contact patient.     [x]Discontinue therapy: []Patient has reached or is progressing toward set goals      [x]Patient is non-compliant or has abdicated      []Due to lack of appreciable progress towards set 55 Laura Calle, PT 10/24/2017 2:16 PM

## 2017-12-21 ENCOUNTER — TELEPHONE (OUTPATIENT)
Dept: FAMILY MEDICINE CLINIC | Age: 57
End: 2017-12-21

## 2017-12-21 NOTE — TELEPHONE ENCOUNTER
Insurance require an alternate medication to [facility]. Dr Venessa Franco reviewed and prescribed Metoprolol ER 100mg #30, 1 daily with no refill. She wanted patient to check BP for 1 week after starting medication and call office with the readings. Contacted patient to inform her, she stated she still had about 1 month of Bystolic left and she wanted to finish that before starting Metoprolol. She will call office when she's ready for med to be filled at [facility].

## 2018-01-09 RX ORDER — METOPROLOL SUCCINATE 100 MG/1
100 TABLET, EXTENDED RELEASE ORAL DAILY
Qty: 30 TAB | Refills: 0 | Status: SHIPPED | OUTPATIENT
Start: 2018-01-09 | End: 2018-02-05 | Stop reason: SDUPTHER

## 2018-01-16 DIAGNOSIS — R06.2 WHEEZING: ICD-10-CM

## 2018-01-19 RX ORDER — ALBUTEROL SULFATE 90 UG/1
AEROSOL, METERED RESPIRATORY (INHALATION)
Qty: 1 INHALER | Refills: 0 | Status: SHIPPED | OUTPATIENT
Start: 2018-01-19

## 2018-02-05 RX ORDER — METOPROLOL SUCCINATE 100 MG/1
TABLET, EXTENDED RELEASE ORAL
Qty: 30 TAB | Refills: 0 | Status: SHIPPED | OUTPATIENT
Start: 2018-02-05 | End: 2018-03-06 | Stop reason: SDUPTHER

## 2018-07-05 RX ORDER — LOSARTAN POTASSIUM AND HYDROCHLOROTHIAZIDE 25; 100 MG/1; MG/1
TABLET ORAL
Qty: 90 TAB | Refills: 3 | OUTPATIENT
Start: 2018-07-05

## 2019-05-08 ENCOUNTER — OFFICE VISIT (OUTPATIENT)
Dept: ORTHOPEDIC SURGERY | Age: 59
End: 2019-05-08

## 2019-05-08 VITALS
WEIGHT: 186 LBS | RESPIRATION RATE: 24 BRPM | TEMPERATURE: 97.9 F | BODY MASS INDEX: 30.99 KG/M2 | HEART RATE: 61 BPM | DIASTOLIC BLOOD PRESSURE: 81 MMHG | HEIGHT: 65 IN | SYSTOLIC BLOOD PRESSURE: 121 MMHG | OXYGEN SATURATION: 99 %

## 2019-05-08 DIAGNOSIS — S63.095A: ICD-10-CM

## 2019-05-08 DIAGNOSIS — M25.531 RIGHT WRIST PAIN: ICD-10-CM

## 2019-05-08 DIAGNOSIS — M25.532 LEFT WRIST PAIN: Primary | ICD-10-CM

## 2019-05-08 RX ORDER — LOSARTAN POTASSIUM 25 MG/1
25 TABLET ORAL
COMMUNITY

## 2019-05-08 RX ORDER — NEBIVOLOL 10 MG/1
TABLET ORAL
COMMUNITY
Start: 2017-07-17

## 2019-05-08 RX ORDER — PITAVASTATIN CALCIUM 2.09 MG/1
TABLET, FILM COATED ORAL
Refills: 0 | COMMUNITY
Start: 2019-03-21

## 2019-05-08 RX ORDER — DICLOFENAC SODIUM 50 MG/1
50 TABLET, DELAYED RELEASE ORAL 2 TIMES DAILY WITH MEALS
Qty: 120 TAB | Refills: 1 | Status: SHIPPED | OUTPATIENT
Start: 2019-05-08

## 2019-05-08 NOTE — PROGRESS NOTES
Patient: Leif Friedman                MRN: [id number]       SSN: [id number]  YOB: 1960        AGE: 60 y.o. SEX: female  Body mass index is 30.95 kg/m². PCP: Maria Victoria Vargas DO  05/08/19    Chief Complaint: Bilateral wrist pain    HISTORY OF PRESENT ILLNESS:  Courtney Hernandez is a 60year-old female who presents to the office today with bilateral wrist pain. The left is worse than the right. The right is sometimes worse at night. She also has some numbness and tingling in the left hand at night. She denies any injury or trauma. The pain is worse when at work. 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 She is not taking any medications for it. She does not wear any braces at this time. Past Medical History:   Diagnosis Date    Asthma     Constipation     GERD (gastroesophageal reflux disease)     Hypercholesterolemia     Hypertension        Family History   Problem Relation Age of Onset    Hypertension Mother     Hypertension Father     Other Father         aneurysm    Stroke Paternal Uncle        Current Outpatient Medications   Medication Sig Dispense Refill    nebivolol (BYSTOLIC) 10 mg tablet       diclofenac EC (VOLTAREN) 50 mg EC tablet Take 1 Tab by mouth two (2) times daily (with meals). 120 Tab 1    metoprolol succinate (TOPROL-XL) 100 mg tablet take 1 tablet by mouth once daily 30 Tab 2    PROAIR HFA 90 mcg/actuation inhaler inhale 2 puffs by mouth every 4 hours if needed for wheezing 1 Inhaler 0    Cholecalciferol, Vitamin D3, (VITAMIN D3) 2,000 unit cap capsule Take 2,000 Units by mouth daily. 30 Cap 6    losartan-hydroCHLOROthiazide (HYZAAR) 100-25 mg per tablet Take 1 Tab by mouth daily. 90 Tab 3    atorvastatin (LIPITOR) 10 mg tablet Take 1 Tab by mouth nightly.  90 Tab 3    esomeprazole (NEXIUM) 40 mg capsule take 1 capsule by mouth once daily 30 Cap 3    linaclotide (LINZESS) 145 mcg cap capsule Take 1 Cap by mouth Daily (before breakfast). 90 Cap 3    aspirin delayed-release (ADULT LOW DOSE ASPIRIN) 81 mg tablet Take 1 Tab by mouth daily. 30 Tab prn    loratadine (CLARITIN) 10 mg tablet Take 10 mg by mouth daily.  LIVALO 2 mg tablet take 1 tablet by mouth at bedtime  0    losartan (COZAAR) 25 mg tablet Take 25 mg by mouth.          Allergies   Allergen Reactions    Lisinopril Other (comments)     Pt states gave her stomach cramps       Past Surgical History:   Procedure Laterality Date    HX ABDOMINOPLASTY      HX BREAST BIOPSY      normal    HX COLONOSCOPY  2010    HX GYN      4 births    HX HYSTERECTOMY  1999    HX ORTHOPAEDIC  12/2014    left great toenail removal       Social History     Socioeconomic History    Marital status:      Spouse name: Not on file    Number of children: Not on file    Years of education: Not on file    Highest education level: Not on file   Occupational History    Not on file   Social Needs    Financial resource strain: Not on file    Food insecurity:     Worry: Not on file     Inability: Not on file    Transportation needs:     Medical: Not on file     Non-medical: Not on file   Tobacco Use    Smoking status: Current Some Day Smoker     Types: Cigarettes    Smokeless tobacco: Never Used   Substance and Sexual Activity    Alcohol use: Yes     Comment: 2 days a week    Drug use: Yes     Types: Prescription    Sexual activity: Yes     Partners: Male     Birth control/protection: Surgical   Lifestyle    Physical activity:     Days per week: Not on file     Minutes per session: Not on file    Stress: Not on file   Relationships    Social connections:     Talks on phone: Not on file     Gets together: Not on file     Attends [employer] service: Not on file     Active member of club or organization: Not on file     Attends meetings of clubs or organizations: Not on file     Relationship status: Not on file    Intimate partner violence:     Fear of current or ex partner: Not on file     Emotionally abused: Not on file     Physically abused: Not on file     Forced sexual activity: Not on file   Other Topics Concern     Service Not Asked    Blood Transfusions Not Asked    Caffeine Concern Not Asked    Occupational Exposure Not Asked    Hobby Hazards Not Asked    Sleep Concern Not Asked    Stress Concern Not Asked    Weight Concern Not Asked    Special Diet Not Asked    Back Care Not Asked    Exercise Not Asked    Bike Helmet Not Asked   [address] Not Asked    Self-Exams Not Asked   Social History Narrative    Not on file       REVIEW OF SYSTEMS:      CON: negative for recent weight loss/gain, fever, or chills  EYE: negative for double or blurry vision  ENT: negative for hoarseness  RS:   negative for cough, URI, SOB  CV:  negative for chest pain, palpitations  GI:    negative for blood in stool, nausea/vomiting  :  negative for blood in urine  MS: As per HPI  Other systems reviewed and noted below. PHYSICAL EXAMINATION:  Visit Vitals  /81   Pulse 61   Temp 97.9 °F (36.6 °C)   Resp 24   Ht 5' 5\" (1.651 m)   Wt 186 lb (84.4 kg)   SpO2 99%   BMI 30.95 kg/m²     Body mass index is 30.95 kg/m². GENERAL: Alert and oriented x3, in no acute distress, well-developed, well-nourished. HEENT: Normocephalic, atraumatic. RESP: Non labored breathing with equal chest rise on inspiration. CV: Well perfused extremities. No cyanosis or clubbing noted. ABDOMEN: Soft, non-tender, non-distended. PHYSICAL EXAM:  Physical exam of the left wrist with mild tenderness to palpation over the radial side of the wrist at the radioscaphoid joint. I do not appreciate any obvious instability of radioscaphoid joint. She is tender to palpation over the scapholunate area. She has a positive Tinel's and Phalen's over the carpal tunnel. No atrophy is noted. Full finger and hand range of motion and wrist range of motion with normal sensation. Negative Finkelstein's. Right hand examination with mild tenderness to palpation over the radial side of the wrist.  Negative Finkelstein's. Full wrist range of motion. Full sensation. IMAGING:  X-rays of the left wrist were taken in the office today. These show diastasis of scapholunate joint with radiocarpal arthritic changes in the radioscaphoid joint. 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 I recommended a wrist brace along with an antiinflammatory today. I also referred her to Dr. Raheem Baugh for further management down the road of her wrist pain.              Electronically signed by: Berkley Wilson MD

## 2019-06-21 ENCOUNTER — OFFICE VISIT (OUTPATIENT)
Dept: ORTHOPEDIC SURGERY | Age: 59
End: 2019-06-21

## 2019-06-21 VITALS
BODY MASS INDEX: 31.49 KG/M2 | HEART RATE: 63 BPM | HEIGHT: 65 IN | WEIGHT: 189 LBS | OXYGEN SATURATION: 97 % | SYSTOLIC BLOOD PRESSURE: 134 MMHG | RESPIRATION RATE: 16 BRPM | DIASTOLIC BLOOD PRESSURE: 84 MMHG

## 2019-06-21 DIAGNOSIS — M19.131 SLAC (SCAPHOLUNATE ADVANCED COLLAPSE) OF WRIST, RIGHT: ICD-10-CM

## 2019-06-21 DIAGNOSIS — M25.531 RIGHT WRIST PAIN: Primary | ICD-10-CM

## 2019-06-21 DIAGNOSIS — M19.132 SLAC (SCAPHOLUNATE ADVANCED COLLAPSE) OF WRIST, LEFT: ICD-10-CM

## 2019-06-21 NOTE — PROGRESS NOTES
Ifeoma Zelaya is a 60 y.o. female right handed . Worker's Compensation and legal considerations: not known. Vitals:    06/21/19 1406   BP: 134/84   Pulse: 63   Resp: 16   SpO2: 97%   Weight: 189 lb (85.7 kg)   Height: 5' 5\" (1.651 m)   PainSc:   6           Chief Complaint   Patient presents with    Wrist Pain     left wrist pain         HPI: Patient comes in today with bilateral wrist pain left worse than right. She says she has been dealing with this for several years but it has been worse over the past 6 months. She denies any recent remote injuries. 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 Date of onset: Long-term    Injury: No    Prior Treatment:  No    Numbness/ Tingling: No    ROS: Review of Systems - General ROS: negative  Respiratory ROS: no cough, shortness of breath, or wheezing  Cardiovascular ROS: no chest pain or dyspnea on exertion  Musculoskeletal ROS: positive for - pain in wrist - bilateral  Neurological ROS: negative  Dermatological ROS: negative    Past Medical History:   Diagnosis Date    Asthma     Constipation     GERD (gastroesophageal reflux disease)     Hypercholesterolemia     Hypertension        Past Surgical History:   Procedure Laterality Date    HX ABDOMINOPLASTY      HX BREAST BIOPSY      normal    HX COLONOSCOPY  2010    HX GYN      4 births    HX HYSTERECTOMY  1999    HX ORTHOPAEDIC  12/2014    left great toenail removal       Current Outpatient Medications   Medication Sig Dispense Refill    triamcinolone acetonide (KENALOG) 10 mg/mL injection 1 mL by Intra artICUlar route once for 1 dose. 1 Vial 0    LIVALO 2 mg tablet take 1 tablet by mouth at bedtime  0    nebivolol (BYSTOLIC) 10 mg tablet       losartan (COZAAR) 25 mg tablet Take 25 mg by mouth.  diclofenac EC (VOLTAREN) 50 mg EC tablet Take 1 Tab by mouth two (2) times daily (with meals).  120 Tab 1    metoprolol succinate (TOPROL-XL) 100 mg tablet take 1 tablet by mouth once daily 30 Tab 2    PROAIR HFA 90 mcg/actuation inhaler inhale 2 puffs by mouth every 4 hours if needed for wheezing 1 Inhaler 0    Cholecalciferol, Vitamin D3, (VITAMIN D3) 2,000 unit cap capsule Take 2,000 Units by mouth daily. 30 Cap 6    losartan-hydroCHLOROthiazide (HYZAAR) 100-25 mg per tablet Take 1 Tab by mouth daily. 90 Tab 3    atorvastatin (LIPITOR) 10 mg tablet Take 1 Tab by mouth nightly. 90 Tab 3    esomeprazole (NEXIUM) 40 mg capsule take 1 capsule by mouth once daily 30 Cap 3    linaclotide (LINZESS) 145 mcg cap capsule Take 1 Cap by mouth Daily (before breakfast). 90 Cap 3    aspirin delayed-release (ADULT LOW DOSE ASPIRIN) 81 mg tablet Take 1 Tab by mouth daily. 30 Tab prn    loratadine (CLARITIN) 10 mg tablet Take 10 mg by mouth daily. Allergies   Allergen Reactions    Lisinopril Other (comments)     Pt states gave her stomach cramps         PE:     Bilateral wrists: There is tenderness to palpation about the radiocarpal joints. There is some pain with terminal extension flexion radial and ulnar deviation as well as supination and pronation. There is tenderness to palpation about the scapholunate joints. Imaging:   Bilateral plain films of the wrists including bilateral pencil  views show significant diastases of the scapholunate joint. There is not a significant VISI or DISI deformity noted at this time. There is no scaphoid fracture nonunion noted at this time. ICD-10-CM ICD-9-CM    1. Right wrist pain M25.531 719.43 AMB POC XRAY, WRIST; COMPLETE, 3+ VIE   2. Slac (scapholunate advanced collapse) of wrist, left M19.132 716.13 TRIAMCINOLONE ACETONIDE INJ      triamcinolone acetonide (KENALOG) 10 mg/mL injection      DRAIN/INJECT SMALL JOINT/BURSA      AMB SUPPLY ORDER   3.  Slac (scapholunate advanced collapse) of wrist, right M19.131 716.13 TRIAMCINOLONE ACETONIDE INJ      triamcinolone acetonide (KENALOG) 10 mg/mL injection      DRAIN/INJECT SMALL JOINT/BURSA      AMB SUPPLY ORDER       Plan:     I had a discussion with the patient regarding operative versus nonoperative treatments. We had a discussion regarding scapholunate reconstruction. We agreed that this would probably not be the best action for her. I told her that in the future she may need possibly a scaphoid excision and a partial fusion versus a proximal row carpectomy. However for the time being she would like to do nonoperative treatment and we will do steroid injections as well as brace wear for work.       Follow-up PRN    Plan was reviewed with patient, who verbalized agreement and understanding of the plan

## 2022-03-18 PROBLEM — E55.9 VITAMIN D DEFICIENCY: Status: ACTIVE | Noted: 2017-10-19

## 2022-09-03 ENCOUNTER — TRANSCRIBE ORDER (OUTPATIENT)
Dept: SCHEDULING | Age: 62
End: 2022-09-03

## 2022-09-03 DIAGNOSIS — M23.92 INTERNAL DERANGEMENT OF LEFT KNEE: ICD-10-CM

## 2022-09-03 DIAGNOSIS — M17.12 PRIMARY OSTEOARTHRITIS OF LEFT KNEE: ICD-10-CM

## 2022-09-03 DIAGNOSIS — R22.32 MASS OF LEFT ELBOW: Primary | ICD-10-CM

## 2023-01-24 ENCOUNTER — HOSPITAL ENCOUNTER (OUTPATIENT)
Dept: GENERAL RADIOLOGY | Age: 63
Discharge: HOME | End: 2023-01-24
Payer: COMMERCIAL

## 2023-01-24 ENCOUNTER — HOSPITAL ENCOUNTER (OUTPATIENT)
Dept: LAB | Age: 63
Discharge: HOME | End: 2023-01-24
Payer: COMMERCIAL

## 2023-01-24 ENCOUNTER — TRANSCRIBE ORDER (OUTPATIENT)
Dept: REGISTRATION | Age: 63
End: 2023-01-24

## 2023-01-24 DIAGNOSIS — Z72.0 TOBACCO ABUSE: Primary | ICD-10-CM

## 2023-01-24 DIAGNOSIS — Z72.0 TOBACCO ABUSE: ICD-10-CM

## 2023-01-24 LAB
ALBUMIN SERPL-MCNC: 4 G/DL (ref 3.4–5)
ALBUMIN/GLOB SERPL: 1.1 (ref 0.8–1.7)
ALP SERPL-CCNC: 68 U/L (ref 45–117)
ALT SERPL-CCNC: 33 U/L (ref 13–56)
ANION GAP SERPL CALC-SCNC: 7 MMOL/L (ref 3–18)
AST SERPL-CCNC: 13 U/L (ref 10–38)
ATRIAL RATE: 60 BPM
BILIRUB SERPL-MCNC: 0.4 MG/DL (ref 0.2–1)
BUN SERPL-MCNC: 17 MG/DL (ref 7–18)
BUN/CREAT SERPL: 18 (ref 12–20)
CALCIUM SERPL-MCNC: 10 MG/DL (ref 8.5–10.1)
CALCULATED P AXIS, ECG09: 36 DEGREES
CALCULATED R AXIS, ECG10: 15 DEGREES
CALCULATED T AXIS, ECG11: 5 DEGREES
CHLORIDE SERPL-SCNC: 102 MMOL/L (ref 100–111)
CO2 SERPL-SCNC: 30 MMOL/L (ref 21–32)
CREAT SERPL-MCNC: 0.97 MG/DL (ref 0.6–1.3)
DIAGNOSIS, 93000: NORMAL
GLOBULIN SER CALC-MCNC: 3.8 G/DL (ref 2–4)
GLUCOSE SERPL-MCNC: 134 MG/DL (ref 74–99)
P-R INTERVAL, ECG05: 166 MS
POTASSIUM SERPL-SCNC: 3.5 MMOL/L (ref 3.5–5.5)
PROT SERPL-MCNC: 7.8 G/DL (ref 6.4–8.2)
Q-T INTERVAL, ECG07: 428 MS
QRS DURATION, ECG06: 86 MS
QTC CALCULATION (BEZET), ECG08: 428 MS
SODIUM SERPL-SCNC: 139 MMOL/L (ref 136–145)
VENTRICULAR RATE, ECG03: 60 BPM

## 2023-01-24 PROCEDURE — 93005 ELECTROCARDIOGRAM TRACING: CPT

## 2023-01-24 PROCEDURE — 71046 X-RAY EXAM CHEST 2 VIEWS: CPT

## 2023-01-24 PROCEDURE — 36415 COLL VENOUS BLD VENIPUNCTURE: CPT

## 2023-01-24 PROCEDURE — 80053 COMPREHEN METABOLIC PANEL: CPT

## 2023-01-31 DIAGNOSIS — M17.12 PRIMARY OSTEOARTHRITIS OF LEFT KNEE: ICD-10-CM

## 2023-01-31 DIAGNOSIS — R22.32 MASS OF LEFT ELBOW: Primary | ICD-10-CM

## 2023-01-31 DIAGNOSIS — M23.92 INTERNAL DERANGEMENT OF LEFT KNEE: ICD-10-CM

## 2023-02-03 DIAGNOSIS — R22.32 MASS OF LEFT ELBOW: Primary | ICD-10-CM

## 2023-02-03 DIAGNOSIS — M17.12 PRIMARY OSTEOARTHRITIS OF LEFT KNEE: ICD-10-CM

## 2023-02-03 DIAGNOSIS — M23.92 INTERNAL DERANGEMENT OF LEFT KNEE: ICD-10-CM
